# Patient Record
Sex: FEMALE | Race: WHITE | Employment: OTHER | URBAN - METROPOLITAN AREA
[De-identification: names, ages, dates, MRNs, and addresses within clinical notes are randomized per-mention and may not be internally consistent; named-entity substitution may affect disease eponyms.]

---

## 2017-01-01 ENCOUNTER — HOSPICE ADMISSION (OUTPATIENT)
Dept: HOSPICE | Facility: HOSPICE | Age: 82
End: 2017-01-01
Payer: MEDICARE

## 2017-01-01 ENCOUNTER — HOME CARE VISIT (OUTPATIENT)
Dept: HOSPICE | Facility: HOSPICE | Age: 82
End: 2017-01-01
Payer: MEDICARE

## 2017-01-01 ENCOUNTER — HOME CARE VISIT (OUTPATIENT)
Dept: SCHEDULING | Facility: HOME HEALTH | Age: 82
End: 2017-01-01
Payer: MEDICARE

## 2017-01-01 ENCOUNTER — APPOINTMENT (OUTPATIENT)
Dept: GENERAL RADIOLOGY | Age: 82
DRG: 689 | End: 2017-01-01
Attending: INTERNAL MEDICINE
Payer: COMMERCIAL

## 2017-01-01 ENCOUNTER — APPOINTMENT (OUTPATIENT)
Dept: GENERAL RADIOLOGY | Age: 82
DRG: 689 | End: 2017-01-01
Attending: EMERGENCY MEDICINE
Payer: COMMERCIAL

## 2017-01-01 ENCOUNTER — HOSPITAL ENCOUNTER (INPATIENT)
Age: 82
LOS: 5 days | Discharge: NURSING, REHAB OR LONG-TERM CARE | DRG: 689 | End: 2017-02-25
Attending: EMERGENCY MEDICINE | Admitting: INTERNAL MEDICINE
Payer: COMMERCIAL

## 2017-01-01 VITALS
OXYGEN SATURATION: 95 % | WEIGHT: 91.49 LBS | HEART RATE: 89 BPM | RESPIRATION RATE: 16 BRPM | HEIGHT: 66 IN | DIASTOLIC BLOOD PRESSURE: 93 MMHG | BODY MASS INDEX: 14.7 KG/M2 | SYSTOLIC BLOOD PRESSURE: 137 MMHG | TEMPERATURE: 97.8 F

## 2017-01-01 VITALS
BODY MASS INDEX: 16.71 KG/M2 | OXYGEN SATURATION: 96 % | SYSTOLIC BLOOD PRESSURE: 126 MMHG | DIASTOLIC BLOOD PRESSURE: 74 MMHG | RESPIRATION RATE: 18 BRPM | HEART RATE: 120 BPM | HEIGHT: 66 IN | WEIGHT: 104 LBS

## 2017-01-01 VITALS
DIASTOLIC BLOOD PRESSURE: 70 MMHG | SYSTOLIC BLOOD PRESSURE: 132 MMHG | OXYGEN SATURATION: 95 % | RESPIRATION RATE: 18 BRPM | HEART RATE: 58 BPM

## 2017-01-01 DIAGNOSIS — N17.9 ACUTE RENAL FAILURE, UNSPECIFIED ACUTE RENAL FAILURE TYPE (HCC): ICD-10-CM

## 2017-01-01 DIAGNOSIS — E86.0 DEHYDRATION: Primary | ICD-10-CM

## 2017-01-01 DIAGNOSIS — N30.00 ACUTE CYSTITIS WITHOUT HEMATURIA: ICD-10-CM

## 2017-01-01 LAB
ALBUMIN SERPL BCP-MCNC: 2.8 G/DL (ref 3.5–5)
ALBUMIN SERPL BCP-MCNC: 3.2 G/DL (ref 3.5–5)
ALBUMIN/GLOB SERPL: 0.7 {RATIO} (ref 1.1–2.2)
ALBUMIN/GLOB SERPL: 0.7 {RATIO} (ref 1.1–2.2)
ALP SERPL-CCNC: 44 U/L (ref 45–117)
ALP SERPL-CCNC: 48 U/L (ref 45–117)
ALT SERPL-CCNC: 12 U/L (ref 12–78)
ALT SERPL-CCNC: 14 U/L (ref 12–78)
ANION GAP BLD CALC-SCNC: 11 MMOL/L (ref 5–15)
ANION GAP BLD CALC-SCNC: 12 MMOL/L (ref 5–15)
ANION GAP BLD CALC-SCNC: 8 MMOL/L (ref 5–15)
ANION GAP BLD CALC-SCNC: 9 MMOL/L (ref 5–15)
APPEARANCE UR: ABNORMAL
AST SERPL W P-5'-P-CCNC: 15 U/L (ref 15–37)
AST SERPL W P-5'-P-CCNC: 20 U/L (ref 15–37)
ATRIAL RATE: 81 BPM
BACTERIA SPEC CULT: ABNORMAL
BACTERIA SPEC CULT: NORMAL
BACTERIA URNS QL MICRO: ABNORMAL /HPF
BASOPHILS # BLD AUTO: 0 K/UL (ref 0–0.1)
BASOPHILS # BLD: 0 % (ref 0–1)
BILIRUB SERPL-MCNC: 0.7 MG/DL (ref 0.2–1)
BILIRUB SERPL-MCNC: 0.7 MG/DL (ref 0.2–1)
BILIRUB UR QL: NEGATIVE
BNP SERPL-MCNC: 2456 PG/ML (ref 0–450)
BUN SERPL-MCNC: 16 MG/DL (ref 6–20)
BUN SERPL-MCNC: 20 MG/DL (ref 6–20)
BUN SERPL-MCNC: 30 MG/DL (ref 6–20)
BUN SERPL-MCNC: 35 MG/DL (ref 6–20)
BUN/CREAT SERPL: 24 (ref 12–20)
BUN/CREAT SERPL: 26 (ref 12–20)
BUN/CREAT SERPL: 27 (ref 12–20)
BUN/CREAT SERPL: 29 (ref 12–20)
CALCIUM SERPL-MCNC: 8.3 MG/DL (ref 8.5–10.1)
CALCIUM SERPL-MCNC: 8.6 MG/DL (ref 8.5–10.1)
CALCIUM SERPL-MCNC: 8.8 MG/DL (ref 8.5–10.1)
CALCIUM SERPL-MCNC: 9.1 MG/DL (ref 8.5–10.1)
CALCULATED P AXIS, ECG09: -5 DEGREES
CALCULATED R AXIS, ECG10: 43 DEGREES
CALCULATED T AXIS, ECG11: 72 DEGREES
CC UR VC: ABNORMAL
CHLORIDE SERPL-SCNC: 101 MMOL/L (ref 97–108)
CHLORIDE SERPL-SCNC: 102 MMOL/L (ref 97–108)
CHLORIDE SERPL-SCNC: 102 MMOL/L (ref 97–108)
CHLORIDE SERPL-SCNC: 104 MMOL/L (ref 97–108)
CO2 SERPL-SCNC: 23 MMOL/L (ref 21–32)
CO2 SERPL-SCNC: 25 MMOL/L (ref 21–32)
CO2 SERPL-SCNC: 26 MMOL/L (ref 21–32)
CO2 SERPL-SCNC: 27 MMOL/L (ref 21–32)
COLOR UR: ABNORMAL
CREAT SERPL-MCNC: 0.66 MG/DL (ref 0.55–1.02)
CREAT SERPL-MCNC: 0.77 MG/DL (ref 0.55–1.02)
CREAT SERPL-MCNC: 1.05 MG/DL (ref 0.55–1.02)
CREAT SERPL-MCNC: 1.31 MG/DL (ref 0.55–1.02)
DIAGNOSIS, 93000: NORMAL
EOSINOPHIL # BLD: 0 K/UL (ref 0–0.4)
EOSINOPHIL NFR BLD: 0 % (ref 0–7)
EPITH CASTS URNS QL MICRO: ABNORMAL /LPF
ERYTHROCYTE [DISTWIDTH] IN BLOOD BY AUTOMATED COUNT: 14 % (ref 11.5–14.5)
ERYTHROCYTE [DISTWIDTH] IN BLOOD BY AUTOMATED COUNT: 14.1 % (ref 11.5–14.5)
ERYTHROCYTE [DISTWIDTH] IN BLOOD BY AUTOMATED COUNT: 14.2 % (ref 11.5–14.5)
FLUAV AG NPH QL IA: NEGATIVE
FLUBV AG NOSE QL IA: NEGATIVE
GLOBULIN SER CALC-MCNC: 3.9 G/DL (ref 2–4)
GLOBULIN SER CALC-MCNC: 4.3 G/DL (ref 2–4)
GLUCOSE SERPL-MCNC: 139 MG/DL (ref 65–100)
GLUCOSE SERPL-MCNC: 156 MG/DL (ref 65–100)
GLUCOSE SERPL-MCNC: 177 MG/DL (ref 65–100)
GLUCOSE SERPL-MCNC: 94 MG/DL (ref 65–100)
GLUCOSE UR STRIP.AUTO-MCNC: NEGATIVE MG/DL
HCT VFR BLD AUTO: 42.1 % (ref 35–47)
HCT VFR BLD AUTO: 44.2 % (ref 35–47)
HCT VFR BLD AUTO: 44.2 % (ref 35–47)
HGB BLD-MCNC: 14.1 G/DL (ref 11.5–16)
HGB BLD-MCNC: 14.4 G/DL (ref 11.5–16)
HGB BLD-MCNC: 15 G/DL (ref 11.5–16)
HGB UR QL STRIP: ABNORMAL
INR PPP: 1.2 (ref 0.9–1.1)
KETONES UR QL STRIP.AUTO: ABNORMAL MG/DL
LACTATE SERPL-SCNC: 1.4 MMOL/L (ref 0.4–2)
LEUKOCYTE ESTERASE UR QL STRIP.AUTO: ABNORMAL
LYMPHOCYTES # BLD AUTO: 15 % (ref 12–49)
LYMPHOCYTES # BLD: 1 K/UL (ref 0.8–3.5)
MAGNESIUM SERPL-MCNC: 2.2 MG/DL (ref 1.6–2.4)
MAGNESIUM SERPL-MCNC: 2.3 MG/DL (ref 1.6–2.4)
MAGNESIUM SERPL-MCNC: 2.5 MG/DL (ref 1.6–2.4)
MCH RBC QN AUTO: 27.1 PG (ref 26–34)
MCH RBC QN AUTO: 27.6 PG (ref 26–34)
MCH RBC QN AUTO: 27.9 PG (ref 26–34)
MCHC RBC AUTO-ENTMCNC: 32.6 G/DL (ref 30–36.5)
MCHC RBC AUTO-ENTMCNC: 33.5 G/DL (ref 30–36.5)
MCHC RBC AUTO-ENTMCNC: 33.9 G/DL (ref 30–36.5)
MCV RBC AUTO: 82.3 FL (ref 80–99)
MCV RBC AUTO: 82.4 FL (ref 80–99)
MCV RBC AUTO: 83.1 FL (ref 80–99)
MONOCYTES # BLD: 1 K/UL (ref 0–1)
MONOCYTES NFR BLD AUTO: 15 % (ref 5–13)
NEUTS SEG # BLD: 4.7 K/UL (ref 1.8–8)
NEUTS SEG NFR BLD AUTO: 70 % (ref 32–75)
NITRITE UR QL STRIP.AUTO: NEGATIVE
P-R INTERVAL, ECG05: 192 MS
PH UR STRIP: 6 [PH] (ref 5–8)
PHOSPHATE SERPL-MCNC: 2.3 MG/DL (ref 2.6–4.7)
PLATELET # BLD AUTO: 193 K/UL (ref 150–400)
PLATELET # BLD AUTO: 228 K/UL (ref 150–400)
PLATELET # BLD AUTO: 239 K/UL (ref 150–400)
POTASSIUM SERPL-SCNC: 3.2 MMOL/L (ref 3.5–5.1)
POTASSIUM SERPL-SCNC: 3.3 MMOL/L (ref 3.5–5.1)
POTASSIUM SERPL-SCNC: 3.9 MMOL/L (ref 3.5–5.1)
POTASSIUM SERPL-SCNC: 4.1 MMOL/L (ref 3.5–5.1)
PROT SERPL-MCNC: 6.7 G/DL (ref 6.4–8.2)
PROT SERPL-MCNC: 7.5 G/DL (ref 6.4–8.2)
PROT UR STRIP-MCNC: 100 MG/DL
PROTHROMBIN TIME: 12.2 SEC (ref 9–11.1)
Q-T INTERVAL, ECG07: 382 MS
QRS DURATION, ECG06: 74 MS
QTC CALCULATION (BEZET), ECG08: 443 MS
RBC # BLD AUTO: 5.11 M/UL (ref 3.8–5.2)
RBC # BLD AUTO: 5.32 M/UL (ref 3.8–5.2)
RBC # BLD AUTO: 5.37 M/UL (ref 3.8–5.2)
RBC #/AREA URNS HPF: ABNORMAL /HPF (ref 0–5)
SERVICE CMNT-IMP: ABNORMAL
SERVICE CMNT-IMP: NORMAL
SODIUM SERPL-SCNC: 137 MMOL/L (ref 136–145)
SODIUM SERPL-SCNC: 137 MMOL/L (ref 136–145)
SODIUM SERPL-SCNC: 138 MMOL/L (ref 136–145)
SODIUM SERPL-SCNC: 138 MMOL/L (ref 136–145)
SP GR UR REFRACTOMETRY: 1.01 (ref 1–1.03)
TROPONIN I SERPL-MCNC: <0.04 NG/ML
UROBILINOGEN UR QL STRIP.AUTO: 1 EU/DL (ref 0.2–1)
VENTRICULAR RATE, ECG03: 81 BPM
WBC # BLD AUTO: 10.8 K/UL (ref 3.6–11)
WBC # BLD AUTO: 6.7 K/UL (ref 3.6–11)
WBC # BLD AUTO: 7.1 K/UL (ref 3.6–11)
WBC URNS QL MICRO: ABNORMAL /HPF (ref 0–4)

## 2017-01-01 PROCEDURE — 65270000015 HC RM PRIVATE ONCOLOGY

## 2017-01-01 PROCEDURE — 0651 HSPC ROUTINE HOME CARE

## 2017-01-01 PROCEDURE — 84100 ASSAY OF PHOSPHORUS: CPT | Performed by: INTERNAL MEDICINE

## 2017-01-01 PROCEDURE — G0156 HHCP-SVS OF AIDE,EA 15 MIN: HCPCS

## 2017-01-01 PROCEDURE — 77010033678 HC OXYGEN DAILY

## 2017-01-01 PROCEDURE — HOSPICE MEDICATION HC HH HOSPICE MEDICATION

## 2017-01-01 PROCEDURE — 74011250637 HC RX REV CODE- 250/637: Performed by: INTERNAL MEDICINE

## 2017-01-01 PROCEDURE — 97116 GAIT TRAINING THERAPY: CPT

## 2017-01-01 PROCEDURE — 85610 PROTHROMBIN TIME: CPT | Performed by: EMERGENCY MEDICINE

## 2017-01-01 PROCEDURE — A9270 NON-COVERED ITEM OR SERVICE: HCPCS

## 2017-01-01 PROCEDURE — 97165 OT EVAL LOW COMPLEX 30 MIN: CPT

## 2017-01-01 PROCEDURE — 87804 INFLUENZA ASSAY W/OPTIC: CPT | Performed by: EMERGENCY MEDICINE

## 2017-01-01 PROCEDURE — 87040 BLOOD CULTURE FOR BACTERIA: CPT | Performed by: EMERGENCY MEDICINE

## 2017-01-01 PROCEDURE — 74011250636 HC RX REV CODE- 250/636: Performed by: INTERNAL MEDICINE

## 2017-01-01 PROCEDURE — 97530 THERAPEUTIC ACTIVITIES: CPT | Performed by: OCCUPATIONAL THERAPIST

## 2017-01-01 PROCEDURE — 3336590001 HSPC ROOM AND BOARD

## 2017-01-01 PROCEDURE — T4541 LARGE DISPOSABLE UNDERPAD: HCPCS

## 2017-01-01 PROCEDURE — 77030011256 HC DRSG MEPILEX <16IN NO BORD MOLN -A

## 2017-01-01 PROCEDURE — G0155 HHCP-SVS OF CSW,EA 15 MIN: HCPCS

## 2017-01-01 PROCEDURE — 74011000258 HC RX REV CODE- 258: Performed by: INTERNAL MEDICINE

## 2017-01-01 PROCEDURE — RAB02 HC HSPC R&B RUG RATE

## 2017-01-01 PROCEDURE — 81001 URINALYSIS AUTO W/SCOPE: CPT | Performed by: EMERGENCY MEDICINE

## 2017-01-01 PROCEDURE — 97535 SELF CARE MNGMENT TRAINING: CPT

## 2017-01-01 PROCEDURE — 80053 COMPREHEN METABOLIC PANEL: CPT | Performed by: INTERNAL MEDICINE

## 2017-01-01 PROCEDURE — 85027 COMPLETE CBC AUTOMATED: CPT | Performed by: INTERNAL MEDICINE

## 2017-01-01 PROCEDURE — 3336500001 HSPC ELECTION

## 2017-01-01 PROCEDURE — 3330220001 HC HSPC R&B RUG RATE

## 2017-01-01 PROCEDURE — A6250 SKIN SEAL PROTECT MOISTURIZR: HCPCS

## 2017-01-01 PROCEDURE — G8978 MOBILITY CURRENT STATUS: HCPCS

## 2017-01-01 PROCEDURE — 87086 URINE CULTURE/COLONY COUNT: CPT | Performed by: INTERNAL MEDICINE

## 2017-01-01 PROCEDURE — 87186 SC STD MICRODIL/AGAR DIL: CPT | Performed by: INTERNAL MEDICINE

## 2017-01-01 PROCEDURE — 36415 COLL VENOUS BLD VENIPUNCTURE: CPT | Performed by: EMERGENCY MEDICINE

## 2017-01-01 PROCEDURE — 36415 COLL VENOUS BLD VENIPUNCTURE: CPT | Performed by: INTERNAL MEDICINE

## 2017-01-01 PROCEDURE — HHS10554 SHAMPOO/BODY WASH 8 OZ ALOE VESTA

## 2017-01-01 PROCEDURE — 99284 EMERGENCY DEPT VISIT MOD MDM: CPT

## 2017-01-01 PROCEDURE — 71010 XR CHEST PORT: CPT

## 2017-01-01 PROCEDURE — 80048 BASIC METABOLIC PNL TOTAL CA: CPT | Performed by: INTERNAL MEDICINE

## 2017-01-01 PROCEDURE — 83735 ASSAY OF MAGNESIUM: CPT | Performed by: EMERGENCY MEDICINE

## 2017-01-01 PROCEDURE — 83605 ASSAY OF LACTIC ACID: CPT | Performed by: EMERGENCY MEDICINE

## 2017-01-01 PROCEDURE — 84484 ASSAY OF TROPONIN QUANT: CPT | Performed by: EMERGENCY MEDICINE

## 2017-01-01 PROCEDURE — 93005 ELECTROCARDIOGRAM TRACING: CPT

## 2017-01-01 PROCEDURE — G0299 HHS/HOSPICE OF RN EA 15 MIN: HCPCS

## 2017-01-01 PROCEDURE — 97161 PT EVAL LOW COMPLEX 20 MIN: CPT

## 2017-01-01 PROCEDURE — A4927 NON-STERILE GLOVES: HCPCS

## 2017-01-01 PROCEDURE — 83880 ASSAY OF NATRIURETIC PEPTIDE: CPT | Performed by: EMERGENCY MEDICINE

## 2017-01-01 PROCEDURE — 99218 HC RM OBSERVATION: CPT

## 2017-01-01 PROCEDURE — 97535 SELF CARE MNGMENT TRAINING: CPT | Performed by: OCCUPATIONAL THERAPIST

## 2017-01-01 PROCEDURE — 80053 COMPREHEN METABOLIC PANEL: CPT | Performed by: EMERGENCY MEDICINE

## 2017-01-01 PROCEDURE — 85025 COMPLETE CBC W/AUTO DIFF WBC: CPT | Performed by: EMERGENCY MEDICINE

## 2017-01-01 PROCEDURE — 92526 ORAL FUNCTION THERAPY: CPT | Performed by: SPEECH-LANGUAGE PATHOLOGIST

## 2017-01-01 PROCEDURE — 83735 ASSAY OF MAGNESIUM: CPT | Performed by: INTERNAL MEDICINE

## 2017-01-01 PROCEDURE — 92610 EVALUATE SWALLOWING FUNCTION: CPT | Performed by: SPEECH-LANGUAGE PATHOLOGIST

## 2017-01-01 PROCEDURE — SE204 HC HSPC R&B RUG RATE

## 2017-01-01 PROCEDURE — 87077 CULTURE AEROBIC IDENTIFY: CPT | Performed by: INTERNAL MEDICINE

## 2017-01-01 PROCEDURE — G8979 MOBILITY GOAL STATUS: HCPCS

## 2017-01-01 PROCEDURE — T4526 ADULT SIZE PULL-ON MED: HCPCS

## 2017-01-01 PROCEDURE — T4522 ADULT SIZE BRIEF/DIAPER MED: HCPCS

## 2017-01-01 RX ORDER — ENOXAPARIN SODIUM 100 MG/ML
25 INJECTION SUBCUTANEOUS EVERY 24 HOURS
Status: DISCONTINUED | OUTPATIENT
Start: 2017-01-01 | End: 2017-01-01

## 2017-01-01 RX ORDER — FLUCONAZOLE 100 MG/1
200 TABLET ORAL
Status: COMPLETED | OUTPATIENT
Start: 2017-01-01 | End: 2017-01-01

## 2017-01-01 RX ORDER — GUAIFENESIN 100 MG/5ML
81 LIQUID (ML) ORAL DAILY
Status: DISCONTINUED | OUTPATIENT
Start: 2017-01-01 | End: 2017-01-01 | Stop reason: HOSPADM

## 2017-01-01 RX ORDER — DEXTROSE, SODIUM CHLORIDE, AND POTASSIUM CHLORIDE 5; .45; .15 G/100ML; G/100ML; G/100ML
100 INJECTION INTRAVENOUS CONTINUOUS
Status: DISCONTINUED | OUTPATIENT
Start: 2017-01-01 | End: 2017-01-01

## 2017-01-01 RX ORDER — POTASSIUM CHLORIDE 7.45 MG/ML
10 INJECTION INTRAVENOUS
Status: COMPLETED | OUTPATIENT
Start: 2017-01-01 | End: 2017-01-01

## 2017-01-01 RX ORDER — DIPHENHYDRAMINE HCL 25 MG
25 CAPSULE ORAL
Status: DISCONTINUED | OUTPATIENT
Start: 2017-01-01 | End: 2017-01-01 | Stop reason: HOSPADM

## 2017-01-01 RX ORDER — ENOXAPARIN SODIUM 100 MG/ML
20 INJECTION SUBCUTANEOUS EVERY 24 HOURS
Status: DISCONTINUED | OUTPATIENT
Start: 2017-01-01 | End: 2017-01-01 | Stop reason: HOSPADM

## 2017-01-01 RX ORDER — POLYETHYLENE GLYCOL 3350 17 G/17G
17 POWDER, FOR SOLUTION ORAL DAILY
Status: DISCONTINUED | OUTPATIENT
Start: 2017-01-01 | End: 2017-01-01 | Stop reason: HOSPADM

## 2017-01-01 RX ORDER — FLUCONAZOLE 100 MG/1
100 TABLET ORAL DAILY
Status: DISCONTINUED | OUTPATIENT
Start: 2017-01-01 | End: 2017-01-01 | Stop reason: HOSPADM

## 2017-01-01 RX ORDER — FLUCONAZOLE 100 MG/1
100 TABLET ORAL DAILY
Qty: 5 TAB | Refills: 0 | Status: SHIPPED | OUTPATIENT
Start: 2017-01-01 | End: 2017-01-01

## 2017-01-01 RX ORDER — AMOXICILLIN 250 MG
1 CAPSULE ORAL DAILY
Status: DISCONTINUED | OUTPATIENT
Start: 2017-01-01 | End: 2017-01-01 | Stop reason: HOSPADM

## 2017-01-01 RX ORDER — FACIAL-BODY WIPES
10 EACH TOPICAL ONCE
Status: DISCONTINUED | OUTPATIENT
Start: 2017-01-01 | End: 2017-01-01 | Stop reason: HOSPADM

## 2017-01-01 RX ORDER — SODIUM CHLORIDE 0.9 % (FLUSH) 0.9 %
5-10 SYRINGE (ML) INJECTION EVERY 8 HOURS
Status: DISCONTINUED | OUTPATIENT
Start: 2017-01-01 | End: 2017-01-01 | Stop reason: HOSPADM

## 2017-01-01 RX ORDER — IPRATROPIUM BROMIDE AND ALBUTEROL SULFATE 2.5; .5 MG/3ML; MG/3ML
3 SOLUTION RESPIRATORY (INHALATION)
Status: DISCONTINUED | OUTPATIENT
Start: 2017-01-01 | End: 2017-01-01 | Stop reason: HOSPADM

## 2017-01-01 RX ORDER — ACETAMINOPHEN 325 MG/1
650 TABLET ORAL
Status: DISCONTINUED | OUTPATIENT
Start: 2017-01-01 | End: 2017-01-01 | Stop reason: HOSPADM

## 2017-01-01 RX ORDER — POTASSIUM CHLORIDE 20 MEQ/1
40 TABLET, EXTENDED RELEASE ORAL
Status: DISCONTINUED | OUTPATIENT
Start: 2017-01-01 | End: 2017-01-01

## 2017-01-01 RX ORDER — ONDANSETRON 2 MG/ML
4 INJECTION INTRAMUSCULAR; INTRAVENOUS
Status: DISCONTINUED | OUTPATIENT
Start: 2017-01-01 | End: 2017-01-01 | Stop reason: HOSPADM

## 2017-01-01 RX ORDER — SODIUM CHLORIDE 0.9 % (FLUSH) 0.9 %
5-10 SYRINGE (ML) INJECTION AS NEEDED
Status: DISCONTINUED | OUTPATIENT
Start: 2017-01-01 | End: 2017-01-01 | Stop reason: HOSPADM

## 2017-01-01 RX ADMIN — DEXTROSE MONOHYDRATE, SODIUM CHLORIDE, AND POTASSIUM CHLORIDE 100 ML/HR: 50; 4.5; 1.49 INJECTION, SOLUTION INTRAVENOUS at 20:23

## 2017-01-01 RX ADMIN — Medication 10 ML: at 21:02

## 2017-01-01 RX ADMIN — CEFTRIAXONE 1 G: 1 INJECTION, POWDER, FOR SOLUTION INTRAMUSCULAR; INTRAVENOUS at 09:30

## 2017-01-01 RX ADMIN — ACETAMINOPHEN 650 MG: 325 TABLET, FILM COATED ORAL at 05:35

## 2017-01-01 RX ADMIN — ACETAMINOPHEN 650 MG: 325 TABLET, FILM COATED ORAL at 20:51

## 2017-01-01 RX ADMIN — ENOXAPARIN SODIUM 20 MG: 60 INJECTION SUBCUTANEOUS at 21:01

## 2017-01-01 RX ADMIN — FLUCONAZOLE 200 MG: 100 TABLET ORAL at 16:19

## 2017-01-01 RX ADMIN — ASPIRIN 81 MG CHEWABLE TABLET 81 MG: 81 TABLET CHEWABLE at 08:40

## 2017-01-01 RX ADMIN — POTASSIUM CHLORIDE 10 MEQ: 10 INJECTION, SOLUTION INTRAVENOUS at 11:29

## 2017-01-01 RX ADMIN — POLYETHYLENE GLYCOL 3350 17 G: 17 POWDER, FOR SOLUTION ORAL at 13:27

## 2017-01-01 RX ADMIN — CEFTRIAXONE 1 G: 1 INJECTION, POWDER, FOR SOLUTION INTRAMUSCULAR; INTRAVENOUS at 08:42

## 2017-01-01 RX ADMIN — ENOXAPARIN SODIUM 20 MG: 60 INJECTION SUBCUTANEOUS at 22:04

## 2017-01-01 RX ADMIN — FLUCONAZOLE 100 MG: 100 TABLET ORAL at 08:40

## 2017-01-01 RX ADMIN — ENOXAPARIN SODIUM 25 MG: 60 INJECTION SUBCUTANEOUS at 01:07

## 2017-01-01 RX ADMIN — Medication 10 ML: at 13:41

## 2017-01-01 RX ADMIN — POTASSIUM CHLORIDE 10 MEQ: 10 INJECTION, SOLUTION INTRAVENOUS at 20:01

## 2017-01-01 RX ADMIN — ENOXAPARIN SODIUM 20 MG: 60 INJECTION SUBCUTANEOUS at 23:12

## 2017-01-01 RX ADMIN — FLUCONAZOLE 100 MG: 100 TABLET ORAL at 09:31

## 2017-01-01 RX ADMIN — ASPIRIN 81 MG CHEWABLE TABLET 81 MG: 81 TABLET CHEWABLE at 13:27

## 2017-01-01 RX ADMIN — Medication 10 ML: at 05:36

## 2017-01-01 RX ADMIN — Medication 10 ML: at 05:22

## 2017-01-01 RX ADMIN — Medication 10 ML: at 08:44

## 2017-01-01 RX ADMIN — Medication 10 ML: at 07:57

## 2017-01-01 RX ADMIN — POTASSIUM CHLORIDE 10 MEQ: 10 INJECTION, SOLUTION INTRAVENOUS at 17:36

## 2017-01-01 RX ADMIN — Medication 10 ML: at 23:12

## 2017-01-01 RX ADMIN — CEFTRIAXONE 1 G: 1 INJECTION, POWDER, FOR SOLUTION INTRAMUSCULAR; INTRAVENOUS at 08:55

## 2017-01-01 RX ADMIN — Medication 10 ML: at 13:10

## 2017-01-01 RX ADMIN — DOCUSATE SODIUM AND SENNOSIDES 1 TABLET: 8.6; 5 TABLET, FILM COATED ORAL at 13:27

## 2017-01-01 RX ADMIN — CEFTRIAXONE 1 G: 1 INJECTION, POWDER, FOR SOLUTION INTRAMUSCULAR; INTRAVENOUS at 08:44

## 2017-01-01 RX ADMIN — DEXTROSE MONOHYDRATE, SODIUM CHLORIDE, AND POTASSIUM CHLORIDE 100 ML/HR: 50; 4.5; 1.49 INJECTION, SOLUTION INTRAVENOUS at 08:43

## 2017-01-01 RX ADMIN — ACETAMINOPHEN 650 MG: 325 TABLET, FILM COATED ORAL at 21:02

## 2017-01-01 RX ADMIN — Medication 10 ML: at 22:09

## 2017-01-01 RX ADMIN — ACETAMINOPHEN 650 MG: 325 TABLET, FILM COATED ORAL at 21:33

## 2017-01-01 RX ADMIN — Medication 10 ML: at 14:34

## 2017-01-01 RX ADMIN — Medication 10 ML: at 01:07

## 2017-01-01 RX ADMIN — ENOXAPARIN SODIUM 20 MG: 60 INJECTION SUBCUTANEOUS at 21:12

## 2017-01-01 RX ADMIN — Medication 10 ML: at 21:12

## 2017-01-01 RX ADMIN — DEXTROSE MONOHYDRATE, SODIUM CHLORIDE, AND POTASSIUM CHLORIDE 100 ML/HR: 50; 4.5; 1.49 INJECTION, SOLUTION INTRAVENOUS at 08:57

## 2017-01-01 RX ADMIN — CEFTRIAXONE 1 G: 1 INJECTION, POWDER, FOR SOLUTION INTRAMUSCULAR; INTRAVENOUS at 09:24

## 2017-02-20 PROBLEM — E86.0 DEHYDRATION: Status: ACTIVE | Noted: 2017-01-01

## 2017-02-20 PROBLEM — N17.9 ARF (ACUTE RENAL FAILURE) (HCC): Status: ACTIVE | Noted: 2017-01-01

## 2017-02-20 PROBLEM — R62.7 FTT (FAILURE TO THRIVE) IN ADULT: Status: ACTIVE | Noted: 2017-01-01

## 2017-02-20 PROBLEM — R53.83 FATIGUE: Status: ACTIVE | Noted: 2017-01-01

## 2017-02-20 PROBLEM — R52 BODY ACHES: Status: ACTIVE | Noted: 2017-01-01

## 2017-02-20 PROBLEM — R63.0 ANOREXIA: Status: ACTIVE | Noted: 2017-01-01

## 2017-02-20 NOTE — IP AVS SNAPSHOT
[address] [facility] 
[phone] Patient: [facility] MRN: [id number] You are allergic to the following Allergen Reactions Codeine Other (comments)  
 confused Sulfa (Sulfonamide Antibiotics) Nausea and Vomiting Recent Documentation Height  
  
  
  
  
  
 1.676 m Unresulted Labs Order Current Status CULTURE, BLOOD Preliminary result Emergency Contacts  (Rel.) Home Phone Work Phone Mobile Phone Narda Barry (Child) [phone] -- --  
 Camille Hill (Other Relative) -- -- [phone] About your hospitalization You were admitted on:  February 20, 2017 You last received care in the:  [address] You were discharged on:  February 25, 2017 Why you were hospitalized Your primary diagnosis was:  Uti (Urinary Tract Infection) Your diagnoses also included:  Dementia, Do Not Resuscitate, Debility, Dysphagia S/P Cva (Cerebrovascular Accident), Htn (Hypertension), Dehydration, Arf (Acute Renal Failure) ([facility]), Fatigue, Body Aches, Ftt (Failure To Thrive) In Adult, Macular Degeneration, Hypercholesteremia, Hx Of Completed Stroke, Gerd (Gastroesophageal Reflux Disease), Essential Tremor, Copd (Chronic Obstructive Pulmonary Disease) ([facility]), Arthritis, Abnormal Chest Ct, Anorexia Providers Seen During Your Hospitalizations Provider Role Specialty Primary office phone Corazon Hawkins MD Attending Provider Emergency Medicine [phone] Charmaine Bryant MD Attending Provider Internal Medicine [phone] Travis Cheema MD Attending Provider Hospitalist [phone] Your Primary Care Physician (PCP) Primary Care Physician Office Phone Office Fax Denise Chand [phone] Follow-up Information Follow up With Details Comments Contact Info Geremias Marquez MD   [address] [facility] [address] [place] [address] 
[phone] Current Discharge Medication List  
  
START taking these medications Dose & Instructions Dispensing Information Comments Morning Noon Evening Bedtime  
 fluconazole 100 mg tablet Commonly known as:  DIFLUCAN Your next dose is: Today, Tomorrow Other:  _________ Dose:  100 mg Take 1 Tab by mouth daily for 5 doses. FDA advises cautious prescribing of oral fluconazole in pregnancy. Quantity:  5 Tab Refills:  0 CONTINUE these medications which have NOT CHANGED Dose & Instructions Dispensing Information Comments Morning Noon Evening Bedtime  
 amLODIPine 2.5 mg tablet Commonly known as:  Elyn Coffer Your next dose is: Today, Tomorrow Other:  _________ Dose:  2.5 mg Take 1 Tab by mouth daily. Quantity:  20 Tab Refills:  0  
     
   
   
   
  
 aspirin 81 mg chewable tablet Your next dose is: Today, Tomorrow Other:  _________ Dose:  81 mg Take 81 mg by mouth daily. Refills:  0 DULCOLAX (BISACODYL) 5 mg EC tablet Generic drug:  bisacodyl Your next dose is: Today, Tomorrow Other:  _________ Dose:  5-10 mg Take 5-10 mg by mouth nightly as needed for Constipation. Refills:  0  
     
   
   
   
  
 polyethylene glycol 17 gram/dose powder Commonly known as:  Lelan Situ Your next dose is: Today, Tomorrow Other:  _________ Dose:  17 g Take 17 g by mouth daily. 1 tablespoon with 8 oz of water daily Quantity:  510 g Refills:  0 STOP taking these medications   
 labetalol 100 mg tablet Commonly known as:  Jorge Salter Where to Get Your Medications Information on where to get these meds will be given to you by the nurse or doctor. ! Ask your nurse or doctor about these medications  
  fluconazole 100 mg tablet Discharge Instructions HOSPITALIST DISCHARGE INSTRUCTIONS 
 
NAME: Yohannes Maxwell :  1922 MRN:  [id number] Date/Time:  2017 9:12 AM 
 
ADMIT DATE: 2017 DISCHARGE DATE: 2017 Attending Physician: Judy Brown MD 
 
DISCHARGE DIAGNOSIS: 
 
Acute encephalopathy ARF (acute renal failure) / Dehydration E. Coli UTI Oral thrush Severe protein calorie malnutrition / weight loss / Anorexia / FTT (failure to thrive) in adult / Abnormal chest CT with RUL spiculated opacity -POA Hx of cva / Dysphagia S/P CVA / Dementia Fatigue / [place] / Arthritis Hypokalemia -resolved HTN 
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 Medications: Per above medication reconciliation. Pain Management: per above medications Recommended diet: NDD2, ensure complete breakfast and dinner, magic cup with lunch Recommended activity: PT/OT Eval and Treat Wound care: None Indwelling devices:  None Supplemental Oxygen: None Required Lab work: Per SNF routine Glucose management:  None Code status: DNR Outside physician follow up: Follow-up Information None Skilled nursing facility/ SNF MD responsible for above on discharge. Information obtained by : 
I understand that if any problems occur once I am at home I am to contact my physician. I understand and acknowledge receipt of the instructions indicated above. Physician's or R.N.'s Signature                                                                  Date/Time Patient or Repres Discharge Orders None Master Route Announcement We are excited to announce that we are making your provider's discharge notes available to you in Master Route. You will see these notes when they are completed and signed by the physician that discharged you from your recent hospital stay. If you have any questions or concerns about any information you see in Master Route, please call the Health Information Department where you were seen or reach out to your Primary Care Provider for more information about your plan of care. Introducing [facility] HE[facility]! Dear Terrell Madrigal: 
Thank you for requesting a Master Route account. Our records indicate that you already have an active Master Route account. You can access your account anytime at [email or web address] Did you know that you can access your hospital and ER discharge instructions at any time in Master Route? You can also review all of your test results from your hospital stay or ER visit. Additional Information If you have questions, please visit the Frequently Asked Questions section of the Master Route website at [email or web address] Remember, Master Route is NOT to be used for urgent needs. For medical emergencies, dial 911. Now available from your iPhone and Android! General Information Please provide this summary of care documentation to your next provider. Patient Signature:  ____________________________________________________________ Date:  ____________________________________________________________  
  
Liudmila Matilde Provider Signature:  ____________________________________________________________ Date:  ____________________________________________________________

## 2017-02-20 NOTE — H&P
[address] [place] [address], [address] [facility]  [phone]    Hospitalist Admission Note      NAME:  Ambrosio Valdez   :   1922   MRN:  [id number]     PCP:  Hermelinda Gómez MD     Date/Time:  2017 5:50 PM          Subjective:     CHIEF COMPLAINT: fatigue     HISTORY OF PRESENT ILLNESS:     Ms. Jh Dunn is a 80 y.o.  female who presented to the Emergency Department complaining of fatigue. Worsening over weeks. Recent treatment of presumed PNA and UTI, with completed course of Levaquin and Bactrim. Recent finding of lung mass, with family decision to do no workup. Baseline weak with walker, and now worse, no ambulation. Mild non-productive cough. Poor appetite and significant weight loss in last months. ER workup show dehydration, but no other significant findings. We will admit her for management. Past Medical History   Diagnosis Date    Abnormal chest CT 2012     RUL spiculated opacity     Aortic aneurysm ([facility] [address] 8/3/2014     Aortic arch aneurysm with partial thrombosis seen on chest CT 2014.   MPOA granddaughter Raquel Cristobal aware, decline further evaluation except blood pressure control given age    [de-identified] Arthritis     COPD (chronic obstructive pulmonary disease) ([address]      on chest CT    DEMENTIA     Do not resuscitate 8/3/2014    Dysphagia due to old stroke 2012     family declined tube feed    Essential tremor     GERD (gastroesophageal reflux disease)     HTN (hypertension)     Hx of completed stroke     Hypercholesteremia     Liver mass 8/3/2014    Macular degeneration         Past Surgical History   Procedure Laterality Date    Hx hysterectomy      Hx orthopaedic       Right Shoulder surgery, Left wrist and Right elbow repair from Fractures    Hx cataract removal Bilateral     Hx colostomy take down      Hx colostomy      Hx carotid endarterectomy Left     Hx breast biopsy Bilateral        Social History   Substance Use Topics    Smoking status: Former Smoker    Smokeless tobacco: Never Used      Comment: quit 30 years ago,smoked for 20 years 1/2 pack day    Alcohol use 0.5 oz/week     1 Cans of beer per week      Comment: 1 beer aday but has not had one for 2 weeks        Family History   Problem Relation Age of Onset    Cancer Mother     Cancer Father         Allergies   Allergen Reactions    Codeine Other (comments)     confused    Sulfa (Sulfonamide Antibiotics) Nausea and Vomiting        Prior to Admission medications    Medication Sig Start Date End Date Taking? Authorizing Provider   amLODIPine (NORVASC) 2.5 mg tablet Take 1 Tab by mouth daily. 12/28/16   Lani Carlos MD   polyethylene glycol (MIRALAX) 17 gram/dose powder Take 17 g by mouth daily. 1 tablespoon with 8 oz of water daily 12/1/15   Marquis Morales MD   aspirin 81 mg chewable tablet Take 81 mg by mouth daily. Jonel Bright MD   bisacodyl (DULCOLAX, BISACODYL,) 5 mg EC tablet Take 5-10 mg by mouth nightly as needed for Constipation. Historical Provider   labetalol (NORMODYNE) 100 mg tablet Take 1 Tab by mouth two (2) times a day.  9/18/12   Nay Macias MD       Review of Systems:  (bold if positive, if negative)    Gen:  weight loss,fatigueEyes:  ENT:  CVS:  Pulm:  CoughdyspneaGI:  nausea  :    MS:  arthritisSkin:  Psych:  Endo:    Hem:  Renal:    Neuro:  weakness      Objective:      VITALS:    Vital signs reviewed; most recent are:    Visit Vitals    /74 (BP 1 Location: Left arm, BP Patient Position: At rest)    Pulse 86    Temp 98.6 °F (37 °C)    Resp 18    Ht 5' 6\" (1.676 m)    Wt 47.6 kg (104 lb 15 oz)    SpO2 96%    BMI 16.94 kg/m2     SpO2 Readings from Last 6 Encounters:   02/20/17 96%   12/28/16 95%   07/17/16 99%   01/20/16 98%   01/17/16 95%   12/01/15 99%        No intake or output data in the 24 hours ending 02/20/17 1750     Exam:     Physical Exam:    Gen:  Thin, frail, cachectic, in no acute distress  HEENT:  Pink conjunctivae, PERRL, hearing intact to voice, dry mucous membranes  Neck:  Supple, without masses, thyroid non-tender  Resp:  No accessory muscle use, clear breath sounds without wheezes rales or rhonchi  Card:  No murmurs, normal S1, S2 without thrills, bruits or peripheral edema  Abd:  Soft, scafoid, non-tender, non-distended, normoactive bowel sounds are present, no mass  Lymph:  No cervical or inguinal adenopathy  Musc:  No cyanosis or clubbing  Skin:  No rashes or ulcers, skin turgor is reduced  Neuro:  Cranial nerves are grossly intact, general motor weakness, follows commands vaguely  Psych:  Poor insight, oriented to person     Labs:    Recent Labs      02/20/17   1551   WBC  6.7   HGB  15.0   HCT  44.2   PLT  193     Recent Labs      02/20/17   1551   NA  138   K  3.3*   CL  101   CO2  26   GLU  139*   BUN  35*   CREA  1.31*   CA  9.1   MG  2.5*   ALB  3.2*   TBILI  0.7   SGOT  20   ALT  14     Lab Results   Component Value Date/Time    Glucose (POC) 135 08/04/2014 08:04 AM    Glucose (POC) 160 08/03/2014 10:06 PM     No results for input(s): PH, PCO2, PO2, HCO3, FIO2 in the last 72 hours. Recent Labs      02/20/17   1551   INR  1.2*          Assessment and Plan:      Fatigue / Debility / Arthritis / Essential tremor / Body aches - POA, multifactorial, DDx dehydration, aging, deconditioning, cancer, lost muscle mass, arthritis. Fall precautions, PT/OT eval.  I suggest hospice. ARF (acute renal failure) / Dehydration - POA due to poor PO intake. Hydrate and follow labs. Severe protein calorie malnutrition / weight loss / Anorexia / FTT (failure to thrive) in adult / Abnormal chest CT with RUL spiculated opacity - FTT POA, I suspect due to lung or other cancer. Perhaps other severe disease, vs dementia. I invasive workup declined, I suggest hospice. Diet as tolerated with supplements.     HTN (hypertension) - Would stop testing or treating, though continue BB if needed to present symptomatic tachycardia. Hx of completed stroke / Dysphagia S/P CVA (cerebrovascular accident) / Dementia - not on specific meds. Supportive care. Would hold ASA if going towards comfort and hospice. COPD (chronic obstructive pulmonary disease) - Noted on chest CT, but not on meds. No wheezing. GERD (gastroesophageal reflux disease) - PI if symptoms    Macular degeneration - Supportive care     Hypercholesteremia - No testing or treatment.       Telemetry reviewed:   normal sinus rhythm    Risk of deterioration: high      Total time spent with patient: 79 [address] [facility] discussed with: Patient, Family, Nursing Staff, Consultant/Specialist and >50% of time spent in counseling and coordination of care    Discussed:  Care Plan       ___________________________________________________    Attending Physician: Sekou Swain MD

## 2017-02-20 NOTE — ED PROVIDER NOTES
HPI Comments: Li Ying is a 80 y.o. Female [facility] resident, who is on 2 liters NCO2 at baseline and whom has a h/o Dementia, CVA, COPD, Bowel obstruction, Aortic aneurysm, Sepsis and GERD presents to [address] [facility] ED via EMS with cc intermittent symptoms of generalized fatigue as well as a fever (TMAX 99.4) and generalized body aches described as pain \"all over\" today. The patient has been evaluated for similar symptoms with the past week. On february 13th she was see for a fever of 101.0F, at which time she had a stat chest x-ray performed that showed a persistent right upper lobe infiltrate with 4cm persistent pulmonary mass, which is suspicious for cancer. The patient was started on a 7 day dose of Lavaquin 700mg, Tylenol 325mg, as well as Bactrim, the later of which was secondary to a chronic uti. In addition to the medications she recently began, the patient also regularly takes 81mg aspirin. The patient is here with her family today and they are concerned that the patient has not improved since starting these medications. The patient and her family deny that the patient has had any symptoms of nausea, vomiting, chest pain, shortness of breath, headache, rash, diarrhea, sweating, weight loss, hematuria or dysuria. PCP: Dianelys Bess MD    There are no other complaints changes or physical findings at this time  Written by Maryana Romero ED Scribe as dictated by [employer]. Mauricio Guerrero MD.    The history is provided by the patient and a relative. Past Medical History:   Diagnosis Date    Abnormal chest CT 9/2012     RUL spiculated opacity     Aortic aneurysm ([address] 8/3/2014     Aortic arch aneurysm with partial thrombosis seen on chest CT 8/2/2014.   MPOA granddaughter Raffaele Locus aware, decline further evaluation except blood pressure control given age    [employer] Arthritis     COPD (chronic obstructive pulmonary disease) ([address]      on chest CT    DEMENTIA     Do not resuscitate 8/3/2014    Dysphagia due to old stroke 9/2012     family declined tube feed    Essential tremor     GERD (gastroesophageal reflux disease)     HTN (hypertension)     Hx of completed stroke     Hypercholesteremia     Liver mass 8/3/2014    Macular degeneration        Past Surgical History:   Procedure Laterality Date    Hx hysterectomy      Hx orthopaedic       Right Shoulder surgery, Left wrist and Right elbow repair from Fractures    Hx cataract removal Bilateral     Hx colostomy take down      Hx colostomy      Hx carotid endarterectomy Left     Hx breast biopsy Bilateral          Family History:   Problem Relation Age of Onset    Cancer Mother     Cancer Father        Social History     Social History    Marital status: SINGLE     Spouse name: N/A    Number of children: N/A    Years of education: N/A     Occupational History    Not on file. Social History Main Topics    Smoking status: Former Smoker    Smokeless tobacco: Never Used      Comment: quit 30 years ago,smoked for 20 years 1/2 pack day    Alcohol use 0.5 oz/week     1 Cans of beer per week      Comment: 1 beer aday but has not had one for 2 weeks    Drug use: No    Sexual activity: Not on file     Other Topics Concern    Not on file     Social History Narrative         ALLERGIES: Codeine and Sulfa (sulfonamide antibiotics)    Review of Systems   Constitutional: Positive for fatigue and fever. Negative for activity change, appetite change, chills, diaphoresis and unexpected weight change. HENT: Negative. Negative for congestion, hearing loss, rhinorrhea, sneezing and voice change. Eyes: Negative. Negative for pain and visual disturbance. Respiratory: Negative. Negative for apnea, cough, choking, chest tightness and shortness of breath. Cardiovascular: Negative. Negative for chest pain and palpitations. Gastrointestinal: Negative. Negative for abdominal distention, abdominal pain, blood in stool, diarrhea, nausea and vomiting. Genitourinary: Negative. Negative for decreased urine volume, difficulty urinating, dysuria, flank pain, frequency, hematuria and urgency. No discharge   Musculoskeletal: Positive for myalgias (+generalized body aches). Negative for arthralgias, back pain and neck stiffness. Skin: Negative. Negative for color change and rash. Neurological: Negative. Negative for dizziness, seizures, syncope, speech difficulty, weakness, numbness and headaches. Hematological: Negative for adenopathy. Psychiatric/Behavioral: Negative. Negative for agitation, behavioral problems, dysphoric mood and suicidal ideas. The patient is not nervous/anxious. All other systems reviewed and are negative. Physical Exam   Constitutional: She is oriented to person, place, and time. She appears well-developed and well-nourished. No distress. HENT:   Head: Normocephalic and atraumatic. Mouth/Throat: Oropharynx is clear and moist. No oropharyngeal exudate. Eyes: Conjunctivae and EOM are normal. Pupils are equal, round, and reactive to light. Right eye exhibits no discharge. Left eye exhibits no discharge. Neck: Normal range of motion. Neck supple. Cardiovascular: Normal rate, regular rhythm and intact distal pulses. Exam reveals no gallop and no friction rub. No murmur heard. Pulmonary/Chest: Effort normal and breath sounds normal. No respiratory distress. She has no wheezes. She has no rales. She exhibits no tenderness. Abdominal: Soft. She exhibits no distension and no mass. There is no tenderness. There is no rebound and no guarding. Palpable pulse in the abdomen   Musculoskeletal: Normal range of motion. She exhibits no edema. Lymphadenopathy:     She has no cervical adenopathy. Neurological: She is alert and oriented to person, place, and time. No cranial nerve deficit. Coordination normal.   Skin: Skin is warm and dry. No rash noted. No erythema. Psychiatric: She has a normal mood and affect. Nursing note and vitals reviewed. MDM  Number of Diagnoses or Management Options  Diagnosis management comments: Ddx: Pneumonia, UTI, Sepsis       Amount and/or Complexity of Data Reviewed  Clinical lab tests: ordered and reviewed  Tests in the radiology section of CPT®: ordered and reviewed  Tests in the medicine section of CPT®: ordered and reviewed  Obtain history from someone other than the patient: yes (Pts family)  Review and summarize past medical records: yes  Independent visualization of images, tracings, or specimens: yes    Patient Progress  Patient progress: stable    ED Course       Procedures    Chief Complaint   Patient presents with    Lethargy     The patient presents to the Emergency Department via EMS from [facility] with complaints of increased lethargy. Patient is currently being treated for Pneumonia and a UTI.       2:40 PM  The patients presenting problems have been discussed, and they are in agreement with the care plan formulated and outlined with them. I have encouraged them to ask questions as they arise throughout their visit.     MEDICATIONS GIVEN:  Medications   sodium chloride (NS) flush 5-10 mL (not administered)   sodium chloride (NS) flush 5-10 mL (not administered)   dextrose 5% - 0.45% NaCl with KCl 20 mEq/L infusion (not administered)       LABS REVIEWED:  Recent Results (from the past 24 hour(s))   EKG, 12 LEAD, INITIAL    Collection Time: 02/20/17  3:08 PM   Result Value Ref Range    Ventricular Rate 81 BPM    Atrial Rate 81 BPM    P-R Interval 192 ms    QRS Duration 74 ms    Q-T Interval 382 ms    QTC Calculation (Bezet) 443 ms    Calculated P Axis -5 degrees    Calculated R Axis 43 degrees    Calculated T Axis 72 degrees    Diagnosis       Normal sinus rhythm  Normal ECG  When compared with ECG of 26-DEC-2016 20:09,  fusion complexes are no longer present     CBC WITH AUTOMATED DIFF    Collection Time: 02/20/17  3:51 PM   Result Value Ref Range    WBC 6.7 3.6 - 11.0 K/uL    RBC 5.37 (H) 3.80 - 5.20 M/uL    HGB 15.0 11.5 - 16.0 g/dL    HCT 44.2 35.0 - 47.0 %    MCV 82.3 80.0 - 99.0 FL    MCH 27.9 26.0 - 34.0 PG    MCHC 33.9 30.0 - 36.5 g/dL    RDW 14.1 11.5 - 14.5 %    PLATELET 562 783 - 206 K/uL    NEUTROPHILS 70 32 - 75 %    LYMPHOCYTES 15 12 - 49 %    MONOCYTES 15 (H) 5 - 13 %    EOSINOPHILS 0 0 - 7 %    BASOPHILS 0 0 - 1 %    ABS. NEUTROPHILS 4.7 1.8 - 8.0 K/UL    ABS. LYMPHOCYTES 1.0 0.8 - 3.5 K/UL    ABS. MONOCYTES 1.0 0.0 - 1.0 K/UL    ABS. EOSINOPHILS 0.0 0.0 - 0.4 K/UL    ABS. BASOPHILS 0.0 0.0 - 0.1 K/UL   METABOLIC PANEL, COMPREHENSIVE    Collection Time: 02/20/17  3:51 PM   Result Value Ref Range    Sodium 138 136 - 145 mmol/L    Potassium 3.3 (L) 3.5 - 5.1 mmol/L    Chloride 101 97 - 108 mmol/L    CO2 26 21 - 32 mmol/L    Anion gap 11 5 - 15 mmol/L    Glucose 139 (H) 65 - 100 mg/dL    BUN 35 (H) 6 - 20 MG/DL    Creatinine 1.31 (H) 0.55 - 1.02 MG/DL    BUN/Creatinine ratio 27 (H) 12 - 20      GFR est AA 46 (L) >60 ml/min/1.73m2    GFR est non-AA 38 (L) >60 ml/min/1.73m2    Calcium 9.1 8.5 - 10.1 MG/DL    Bilirubin, total 0.7 0.2 - 1.0 MG/DL    ALT (SGPT) 14 12 - 78 U/L    AST (SGOT) 20 15 - 37 U/L    Alk.  phosphatase 48 45 - 117 U/L    Protein, total 7.5 6.4 - 8.2 g/dL    Albumin 3.2 (L) 3.5 - 5.0 g/dL    Globulin 4.3 (H) 2.0 - 4.0 g/dL    A-G Ratio 0.7 (L) 1.1 - 2.2     MAGNESIUM    Collection Time: 02/20/17  3:51 PM   Result Value Ref Range    Magnesium 2.5 (H) 1.6 - 2.4 mg/dL   PROTHROMBIN TIME + INR    Collection Time: 02/20/17  3:51 PM   Result Value Ref Range    INR 1.2 (H) 0.9 - 1.1      Prothrombin time 12.2 (H) 9.0 - 11.1 sec   TROPONIN I    Collection Time: 02/20/17  3:51 PM   Result Value Ref Range    Troponin-I, Qt. <0.04 <0.05 ng/mL   PRO-BNP    Collection Time: 02/20/17  3:51 PM   Result Value Ref Range    NT pro-BNP 2456 (H) 0 - 450 PG/ML   LACTIC ACID, PLASMA    Collection Time: 02/20/17  3:52 PM   Result Value Ref Range    Lactic acid 1.4 0.4 - 2.0 MMOL/L   URINALYSIS W/MICROSCOPIC    Collection Time: 02/20/17  4:28 PM   Result Value Ref Range    Color YELLOW/STRAW      Appearance CLOUDY (A) CLEAR      Specific gravity 1.015 1.003 - 1.030      pH (UA) 6.0 5.0 - 8.0      Protein 100 (A) NEG mg/dL    Glucose NEGATIVE  NEG mg/dL    Ketone TRACE (A) NEG mg/dL    Bilirubin NEGATIVE  NEG      Blood LARGE (A) NEG      Urobilinogen 1.0 0.2 - 1.0 EU/dL    Nitrites NEGATIVE  NEG      Leukocyte Esterase MODERATE (A) NEG      WBC 20-50 0 - 4 /hpf    RBC 10-20 0 - 5 /hpf    Epithelial cells FEW FEW /lpf    Bacteria 2+ (A) NEG /hpf   INFLUENZA A & B AG (RAPID TEST)    Collection Time: 02/20/17  4:34 PM   Result Value Ref Range    Influenza A Antigen NEGATIVE  NEG      Influenza B Antigen NEGATIVE  NEG         VITAL SIGNS:  Patient Vitals for the past 12 hrs:   Temp Pulse Resp BP SpO2   02/20/17 1504 98.6 °F (37 °C) 86 18 135/74 96 %       RADIOLOGY RESULTS:  The following have been ordered and reviewed:  CXR Results  (Last 48 hours)               02/20/17 1557  XR CHEST PORT Final result    Impression:  IMPRESSION:       No acute process. Stable exam.           Narrative:  EXAM:  XR CHEST PORT       INDICATION:  Lethargy. Currently being treated for pneumonia and UTI. Generalized weakness for 2 weeks. COMPARISON: 12/26/2016       TECHNIQUE: Portable AP semiupright chest view at 1551 hours       FINDINGS: Cardiac monitoring wires overlie the thorax. The cardiomediastinal   contours are stable. The pulmonary vasculature is within normal limits. There is stable diffuse chronic markings that are most prominent in the right   upper lung. There is no new airspace opacity, pleural effusion, or pneumothorax. The bones and upper abdomen are stable. EKG interpretation: (Preliminary) 1508  Rhythm: normal sinus rhythm; and regular .  Rate (approx.): 81; Axis: normal; P wave: normal; QRS interval: normal ; ST/T wave: normal;     CONSULTATIONS: 5:16 PM  Sea Melissa MD spoke with Dr. Narcisa Simmons,   Specialty: Hospitalist  Discussed pt's hx, disposition, and available diagnostic and imaging results. Reviewed care plans. Consultant will evaluate pt for admission. Written by Alexandru Hernandez MD, ED Scribe, as dictated by Twyla Melissa MD.    PROGRESS NOTES:  4:25 PM  I have just reevaluated the patient. I have reviewed Her vital signs and determined there is currently no worsening in their condition or physical exam.  Results have been reviewed with them and their questions have been answered. We will continue to review further results as they come available. DIAGNOSIS:    1. Dehydration    2. Acute renal failure, unspecified acute renal failure type ([facility] [address]    3. Acute cystitis without hematuria        PLAN: Admit     5:16 PM  Patient is being admitted to the hospital.  The results of their tests and reasons for their admission have been discussed with them and/or available family. They convey agreement and understanding for the need to be admitted and for their admission diagnosis. Consultation has been made with the inpatient physician specialist for hospitalization. This note is prepared by Alexandru Hernandez MD acting as Scribe for [employer]. Elma Melissa, [address] [facility] Elma Melissa MD: The Scribe's documentation has been prepared under my direction and personally reviewed by me in its entirety. I confirm that the note above accurately reflects all work, treatment, procedures, and medical decision making performed by me.

## 2017-02-20 NOTE — Clinical Note
Thank you for allowing us to provide you with excellent care today. We hope we addressed all of your concerns and needs. We strive to provide excellent quality care in the Emergency Department. Please rate us as excellent, as anything less than exce llent does not meet our expectations. If you feel that you have not received excellent quality care or timely care, please ask to speak to the nurse manager. Please choose us in the future for your continued health care needs. The exam and treatm ent you received in the Emergency Department were for an urgent problem and are not intended as complete care. It is important that you follow-up with a doctor, nurse practitioner, or physician assistant to:  (1) confirm your diagnosis,  (2) re-evaluatio n of changes in your illness and treatment, and  (3) for ongoing care. If your symptoms become worse or you do not improve as expected and you are unable to reach your usual health care provider, you should return to the Emergency Department. We are yojana ilable 24 hours a day. Take this sheet with you when you go to your follow-up visit. If you have any problem arranging the follow-up visit, contact [address] [place] [address] 21 [phone]) Make an appointment with your Primary Care doctor for follow up of this visit. Re turn to the ER if you are unable to be seen in the time recommended on your discharge instructions.

## 2017-02-20 NOTE — ED NOTES
Pt arrives via EMS. Pt c/o generalized weakness x 2 weeks. Pt has hx of pneumonia and UTI and is being treated for these. Pt states she \"feels bad all over. \" Monitor x 2. Call bell within reach and plan of care discussed.

## 2017-02-21 NOTE — PROGRESS NOTES
Pt. Admitted under Observation from the ER for dehydration and ARF, and UTI. She is complaining of lethargy and not being able to walk. She has a hx. Of COPD, Dementia, CVA,Bowel Obstr., Aortic Aneursym, Sepsis, and Pulmonary mass- 4 cm. She is currently being treated for pneumonia and UTI. She lives at [facility] as a [facility] resident and is usually up with a RW. Her dtr. Lives in [place] and will be here this afternoon. The drPaco Is suggesting hospice and a consult has been entered. CM will follow up with dtr. -- Chris Hobbs RN, [facility]--[phone]--    Care Management Interventions  PCP Verified by CM:  Yes  Mode of Transport at Discharge: BLS  Transition of Care Consult (CM Consult): Discharge Planning, [address]: No  Discharge Durable Medical Equipment: No  Health Maintenance Reviewed: Yes  Physical Therapy Consult: Yes  Occupational Therapy Consult: Yes  Current Support Network: Nursing Facility  Confirm Follow Up Transport: Other (see comment) (lives at [facility])

## 2017-02-21 NOTE — PROGRESS NOTES
Problem: Self Care Deficits Care Plan (Adult)  Goal: *Acute Goals and Plan of Care (Insert Text)  Occupational Therapy Goals  Initiated 2/21/2017  1. Patient will perform self-feeding in supported sitting with supervision/set-up within 7 day(s). 2. Patient will perform upper body dressing while sitting EOB with supervision/set-up within 7 day(s). 3. Patient will perform grooming while sitting EOB with contact guard assist within 7 day(s). 4. Patient will perform toilet transfers with minimal assistance at  level within 7 day(s). OCCUPATIONAL THERAPY EVALUATION  Patient: Micky Chacon (4 y.o. female)  Date: 2/21/2017  Primary Diagnosis: fatigue        Precautions:  Contact, Fall      ASSESSMENT :  Based on the objective data described below, the patient presents with impaired functional mobility, activity tolerance and balance necessary in ADL tasks. She was alert to voices, oriented to self and location, and able to follow commands with addition verbal cues and time. She has hx of dementia, CVA, and lung malignancy. Multiple family members were present and reports that patient is a resident at [facility] where she was ambulatory at [facility] to bathroom and complete UB care/grooming with supervision. Additional assistance provided by staff for LB care. Not on O2 at baseline. Provided education through verbal cues for sequencing, safety and body mechanics for bed mobility at mod A, self care transfer at [facility] with mod A, and combing hair in support sitting with additional verbal cues and time for all ADL tasks. At this time patient needs min A to max A for ADL tasks. Recommend return to [facility] for skilled therapy services. Patient likes to be called \"Meemoo\"     Patient will benefit from skilled intervention to address the above impairments.   Patients rehabilitation potential is considered to be Good  Factors which may influence rehabilitation potential include:   [ ]             None noted  [X]             Mental ability/status  [ ]             Medical condition  [ ]             Home/family situation and support systems  [ ]             Safety awareness  [ ]             Pain tolerance/management  [ ]             Other:        PLAN :  Recommendations and Planned Interventions:  [X]               Self Care Training                  [X]        Therapeutic Activities  [X]               Functional Mobility Training    [ ]        Cognitive Retraining  [X]               Therapeutic Exercises           [X]        Endurance Activities  [X]               Balance Training                   [ ]        Neuromuscular Re-Education  [ ]               Visual/Perceptual Training     [X]   Home Safety Training  [X]               Patient Education                 [X]        Family Training/Education  [ ]               Other (comment):     Frequency/Duration: Patient will be followed by occupational therapy 4 times a week to address goals. Discharge Recommendations: Leonidas Lyn  Further Equipment Recommendations for Discharge: TBD by SNF       SUBJECTIVE:   Patient stated Preston Lima.       OBJECTIVE DATA SUMMARY:   HISTORY:   Past Medical History   Diagnosis Date    Abnormal chest CT 9/2012       RUL spiculated opacity     Aortic aneurysm (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   MPOA granddaughter Patrick Alfa aware, decline further evaluation except blood pressure control given age    Orma Damme Arthritis      COPD (chronic obstructive pulmonary disease) ([facility] [address]         on chest CT    DEMENTIA      Do not resuscitate 8/3/2014    Dysphagia due to old stroke 9/2012       family declined tube feed    Essential tremor      GERD (gastroesophageal reflux disease)      HTN (hypertension)      Hx of completed stroke      Hypercholesteremia      Liver mass 8/3/2014    Macular degeneration       Past Surgical History   Procedure Laterality Date    Hx hysterectomy        Hx orthopaedic           Right Shoulder surgery, Left wrist and Right elbow repair from Fractures    Hx cataract removal Bilateral      Hx colostomy take down        Hx colostomy        Hx carotid endarterectomy Left      Hx breast biopsy Bilateral          Prior Level of Function/Home Situation: Cyndee Rockwell Environment: Assisted living  [address] [place] [address] [place] Name: [address] [place] [address],[address]  # Steps to Enter: 0  One/Two Story Residence: One story  Living Alone: No  Support Systems: Assisted living, Family member(s), Child(kymberly)  Patient Expects to be Discharged to[de-identified] Assisted living  Current DME Used/Available at Home: Alveta Gallery, rolling  Tub or Shower Type: Shower  [X]  Right hand dominant             [ ]  Left hand dominant     EXAMINATION OF PERFORMANCE DEFICITS:  Cognitive/Behavioral Status:  Neurologic State: Alert;Eyes open to voice; Eyes open spontaneously  Orientation Level: Unable to verbalize     Skin: thin, with darkening skin in BLE distally     Edema: uppers none     Vision/Perceptual:    Acuity:  (functional for basic ADL tasks)       Range of Motion:  AROM: Generally decreased, functional- BUE     Strength:  Strength: Generally decreased, functional     Coordination:  Coordination: Generally decreased, functional  Fine Motor Skills-Upper: Left Intact; Right Intact    Gross Motor Skills-Upper: Left Intact; Right Intact      Tone & Sensation:  Tone: Normal- BUE  Sensation: Intact- BUE        Balance:  Sitting: Impaired  Sitting - Static: Fair (occasional)  Sitting - Dynamic: Fair (occasional)  Standing: Impaired; With support  Standing - Static: Fair;Constant support  Standing - Dynamic : Fair     Functional Mobility and Transfers for ADLs:  Bed Mobility:  Rolling: Moderate assistance  Supine to Sit: Moderate assistance  Scooting: Moderate assistance     Transfers:  Sit to Stand:  Moderate assistance  Stand to Sit: Minimum assistance  Bed to Chair: Moderate assistance;Assist x1 ADL Assessment:  Feeding: Minimum assistance  Oral Facial Hygiene/Grooming: Minimum assistance  Bathing: Maximum assistance  Upper Body Dressing: Minimum assistance  Lower Body Dressing: Maximum assistance  Toileting: Maximum assistance           ADL Intervention and task modifications:     Provided education through verbal cues with additional time and additional cuing. Needing cues for sequencing, safety and body mechanics for bed mobility, self care transfer at  level, and combing hair in support sitting following transfer. Patient sitting with fair static balance needing additional time for positioning to scoot forward for erect posture. Cuing needed for hand placement and safety sit <> stand at [facility] for self care transfer. Once in chair, completed grooming with alternating hands to comb hair. Grooming  Brushing/Combing Hair: Contact guard assistance (additional time and cuing)     Functional Measure:  Barthel Index:      Bathin  Bladder: 5  Bowels: 5  Groomin  Dressin  Feedin  Mobility: 0  Stairs: 0  Toilet Use: 5  Transfer (Bed to Chair and Back): 10  Total: 35         Barthel and G-code impairment scale:  Percentage of impairment CH  0% CI  1-19% CJ  20-39% CK  40-59% CL  60-79% CM  80-99% CN  100%   Barthel Score 0-100 100 99-80 79-60 59-40 20-39 1-19    0   Barthel Score 0-20 20 17-19 13-16 9-12 5-8 1-4 0      The Barthel ADL Index: Guidelines  1. The index should be used as a record of what a patient does, not as a record of what a patient could do. 2. The main aim is to establish degree of independence from any help, physical or verbal, however minor and for whatever reason. 3. The need for supervision renders the patient not independent. 4. A patient's performance should be established using the best available evidence. Asking the patient, friends/relatives and nurses are the usual sources, but direct observation and common sense are also important.  However direct testing is not needed. 5. Usually the patient's performance over the preceding 24-48 hours is important, but occasionally longer periods will be relevant. 6. Middle categories imply that the patient supplies over 50 per cent of the effort. 7. Use of aids to be independent is allowed. Kenton Warren., Barthel, D.W. (1470). Functional evaluation: the Barthel Index. [facility] (14)2. Ty Lee esteban GAGAN Pink, Marly Anne, Desmond Grady., Evans, [address] [place] [address] (1999). Measuring the change indisability after inpatient rehabilitation; comparison of the responsiveness of the Barthel Index and Functional Sanford Measure. Journal of Neurology, Neurosurgery, and Psychiatry, 66(4), 238-405. DUSTIN Schumacher, ARJUN Painter, & Shanelle Rosenberg M.A. (2004.) Assessment of post-stroke quality of life in cost-effectiveness studies: The usefulness of the Barthel Index and the EuroQoL-5D. Quality of Life Research, 13, 681-98         G codes: In compliance with CMSs Claims Based Outcome Reporting, the following G-code set was chosen for this patient based on their primary functional limitation being treated: The outcome measure chosen to determine the severity of the functional limitation was the Barthel Index with a score of 35/100 which was correlated with the impairment scale.       · Self Care:               - CURRENT STATUS:    CL - 60%-79% impaired, limited or restricted               - GOAL STATUS:           CK - 40%-59% impaired, limited or restricted               - D/C STATUS:                       ---------------To be determined---------------      Occupational Therapy Evaluation Charge Determination   History Examination Decision-Making   LOW Complexity : Brief history review  MEDIUM Complexity : 3-5 performance deficits relating to physical, cognitive , or psychosocial skils that result in activity limitations and / or participation restrictions MEDIUM Complexity : Patient may present with comorbidities that affect occupational performnce. Miniml to moderate modification of tasks or assistance (eg, physical or verbal ) with assesment(s) is necessary to enable patient to complete evaluation       Based on the above components, the patient evaluation is determined to be of the following complexity level: LOW   Pain:  Pain Scale 1: Adult Nonverbal Pain Scale  Pain Intensity 1: 0     Activity Tolerance:   2 L O2. After treatment:   [X] Patient left in no apparent distress sitting up in chair  [ ] Patient left in no apparent distress in bed  [X] Call bell left within reach  [X] Nursing notified  [X] Caregiver present  [ ] Bed alarm activated      COMMUNICATION/EDUCATION:   The patients plan of care was discussed with: Physical Therapist, Registered Nurse and Patient. [X] Home safety education was provided and the patient/caregiver indicated understanding. [X] Patient/family have participated as able in goal setting and plan of care. [X] Patient/family agree to work toward stated goals and plan of care. [ ] Patient understands intent and goals of therapy, but is neutral about his/her participation. [ ] Patient is unable to participate in goal setting and plan of care. This patients plan of care is appropriate for delegation to [facility].      Thank you for this referral.  Maine Ndiaye, OT  Time Calculation: 19 mins

## 2017-02-21 NOTE — PROGRESS NOTES
Oncology Nursing Communication Tool      5:13 PM  2/21/2017     Bedside shift change report given to Madhavi Sandoval RN (incoming nurse) by Lillian Haley RN (outgoing nurse) on Merced Loveless a 80 y.o. female who was admitted on 2/20/2017  2:35 PM. Report included the following information SBAR, Kardex, ED Summary, Intake/Output, MAR and Recent Results. Significant changes during shift: none      Issues for physician to address: none           Code Status: DNR     Infections: ESBL     Allergies: Codeine and Sulfa (sulfonamide antibiotics)     Current diet: DIET DYSPHAGIA MECH ALTERED (NDD2)       Pain Controlled [x] yes [] no   Bowel Movement [] yes [x] no   Last Bowel Movement (date)     Unable to recall            Vital Signs:   Patient Vitals for the past 12 hrs:   Temp Pulse Resp BP SpO2   02/21/17 1519 98 °F (36.7 °C) 76 18 145/90 97 %      Intake & Output:   No intake or output data in the 24 hours ending 02/21/17 1713   Laboratory Results:   No results found for this or any previous visit (from the past 12 hour(s)). Opportunity for questions and clarifications were given to the incoming nurse. Patient's bed is in low position, side rails x2, door open PRN, call bell within reach and patient not in distress.       Lillian Haley RN

## 2017-02-21 NOTE — PROGRESS NOTES
TRANSFER - IN REPORT:    Verbal report received from [address] [place] (name) on Vasyl Go  being received from ED (unit) for routine progression of care      Report consisted of patients Situation, Background, Assessment and   Recommendations(SBAR). Information from the following report(s) SBAR, Kardex, ED Summary, Intake/Output, MAR and Recent Results was reviewed with the receiving nurse. Opportunity for questions and clarification was provided. Assessment completed upon patients arrival to unit and care assumed.

## 2017-02-21 NOTE — ED NOTES
TRANSFER - OUT REPORT:    Verbal report given to JOSE Flores(name) on KATTY WHITAKER  being transferred to Oncology(unit) for routine progression of care       Report consisted of patients Situation, Background, Assessment and   Recommendations(SBAR). Information from the following report(s) SBAR and ED Summary was reviewed with the receiving nurse. Lines:   Peripheral IV 02/20/17 Right Arm (Active)   Site Assessment Clean, dry, & intact 2/20/2017  3:55 PM   Phlebitis Assessment 0 2/20/2017  3:55 PM   Infiltration Assessment 0 2/20/2017  3:55 PM   Dressing Status Clean, dry, & intact 2/20/2017  3:55 PM   Dressing Type Transparent 2/20/2017  3:55 PM   Hub Color/Line Status Pink 2/20/2017  3:55 PM        Opportunity for questions and clarification was provided.       Patient transported with:   O2 @ 2 liters

## 2017-02-21 NOTE — PROGRESS NOTES
Nutrition Services      Nutrition Screen:  Wt Readings from Last 10 Encounters:   02/21/17 40 kg (88 lb 3.2 oz)   12/26/16 49.4 kg (108 lb 14.5 oz)   07/17/16 49.4 kg (109 lb)   12/01/15 51 kg (112 lb 7 oz)   10/22/15 53.1 kg (117 lb)   09/28/15 53.4 kg (117 lb 12.8 oz)   09/28/15 54 kg (119 lb)   12/19/14 59 kg (130 lb)   11/16/14 61.2 kg (135 lb)   09/23/14 60.8 kg (134 lb)     Body mass index is 14.24 kg/(m^2). Supplements:                        _____ ordered ______  declined. __ __  Pt is nutritionally stable at this time, will rescreen in 7 days. __ __    Pt is at nutritional risk and will be rescreened in  days. __x __  Pt is at moderate or high nutritional risk, will refer to RD for assessment &/or plan.        Jillian Johnson  Dietetic Technician, Registered

## 2017-02-21 NOTE — WOUND CARE
WOUND CARE CONSULT:  Consult by nurse request for stage I pressure injury. Chart reviewed, patient assessed. Admitted for fatigue with a recent finding of lung mass, with a history of aortic aneurysm, COPD, dementia, dysphagia, essential tremor, GERD, HTN, stroke, hypercholesterolemia, liver mass and macular degeneration. Patient is a very thin woman, her sacrum is blanchable erythema. She also has two linear lines on the right buttock/lower right back that also sergei. Staff have applied a sacral border dressing. Patient turned to the right side. Recommendations:    Turn every 2 hours from left to right. Keep off back. Float heels at all times while in bed.     Irvin Councilman, RN, BSN, Wound Care Nurse

## 2017-02-21 NOTE — PROGRESS NOTES
Pharmacy  [facility] Monitoring     Enoxaparin Indication: DVT Prophylaxis  Current Dose: 25 mg Daily  Creatinine Clearance: 20.7    Recent Labs      02/21/17   0454  02/20/17   1551   CREA  1.05*  1.31*   BUN  30*  35*   HGB  14.1  15.0   PLT  228  193     Wt Readings from Last 1 Encounters:   02/21/17 40 kg (88 lb 3.2 oz)   Body mass index is 14.24 kg/(m^2). Impression/Plan: Weight 40 kg, creatinine clearance 20.7 ml/min. Dose adjusted to 20 mg daily per protocol.      Thanks,  Sirena Rodriguez, [facility]

## 2017-02-21 NOTE — PROGRESS NOTES
Oncology Nursing Communication Tool      7:31 PM  2/21/2017     Bedside shift change report given to Ryan Thomson RN (incoming nurse) by Steff Yanez RN (outgoing nurse) on Celestine Coto a 80 y.o. female who was admitted on 2/20/2017  2:35 PM. Report included the following information SBAR, Kardex, Procedure Summary, Intake/Output, MAR, Accordion, Recent Results and Med Rec Status. Significant changes during shift: Hospice consult today. Received 2 of 3 runs of IV potassium--will need third from oncoming shift. Issues for physician to address: None           Code Status: DNR     Infections: ESBL     Allergies: Codeine and Sulfa (sulfonamide antibiotics)     Current diet: DIET DYSPHAGIA MECH ALTERED (NDD2)       Pain Controlled [x] yes [] no   Bowel Movement [] yes [x] no   Last Bowel Movement (date) ?             Vital Signs:   Patient Vitals for the past 12 hrs:   Temp Pulse Resp BP SpO2   02/21/17 0448 97 °F (36.1 °C) 80 18 (!) 159/98 93 %      Intake & Output:   No intake or output data in the 24 hours ending 02/21/17 1126   Laboratory Results:     Recent Results (from the past 12 hour(s))   CBC W/O DIFF    Collection Time: 02/21/17  4:54 AM   Result Value Ref Range    WBC 7.1 3.6 - 11.0 K/uL    RBC 5.11 3.80 - 5.20 M/uL    HGB 14.1 11.5 - 16.0 g/dL    HCT 42.1 35.0 - 47.0 %    MCV 82.4 80.0 - 99.0 FL    MCH 27.6 26.0 - 34.0 PG    MCHC 33.5 30.0 - 36.5 g/dL    RDW 14.0 11.5 - 14.5 %    PLATELET 805 336 - 365 K/uL   MAGNESIUM    Collection Time: 02/21/17  4:54 AM   Result Value Ref Range    Magnesium 2.3 1.6 - 2.4 mg/dL   METABOLIC PANEL, COMPREHENSIVE    Collection Time: 02/21/17  4:54 AM   Result Value Ref Range    Sodium 137 136 - 145 mmol/L    Potassium 3.2 (L) 3.5 - 5.1 mmol/L    Chloride 102 97 - 108 mmol/L    CO2 27 21 - 32 mmol/L    Anion gap 8 5 - 15 mmol/L    Glucose 177 (H) 65 - 100 mg/dL    BUN 30 (H) 6 - 20 MG/DL    Creatinine 1.05 (H) 0.55 - 1.02 MG/DL    BUN/Creatinine ratio 29 (H) 12 - 20      GFR est AA 59 (L) >60 ml/min/1.73m2    GFR est non-AA 49 (L) >60 ml/min/1.73m2    Calcium 8.8 8.5 - 10.1 MG/DL    Bilirubin, total 0.7 0.2 - 1.0 MG/DL    ALT (SGPT) 12 12 - 78 U/L    AST (SGOT) 15 15 - 37 U/L    Alk. phosphatase 44 (L) 45 - 117 U/L    Protein, total 6.7 6.4 - 8.2 g/dL    Albumin 2.8 (L) 3.5 - 5.0 g/dL    Globulin 3.9 2.0 - 4.0 g/dL    A-G Ratio 0.7 (L) 1.1 - 2.2     PHOSPHORUS    Collection Time: 02/21/17  4:54 AM   Result Value Ref Range    Phosphorus 2.3 (L) 2.6 - 4.7 MG/DL              Opportunity for questions and clarifications were given to the incoming nurse. Patient's bed is in low position, side rails x2, door open PRN, call bell within reach and patient not in distress.       Michelle Bradford, RN

## 2017-02-21 NOTE — PROGRESS NOTES
Problem: Mobility Impaired (Adult and Pediatric)  Goal: *Acute Goals and Plan of Care (Insert Text)  Physical Therapy Goals  Initiated 2/21/2017  1. Patient will move from supine to sit and sit to supine , scoot up and down and roll side to side in bed with supervision/set-up within 7 day(s). 2. Patient will transfer from bed to chair and chair to bed with supervision/set-up using the least restrictive device within 7 day(s). 3. Patient will perform sit to stand with supervision/set-up within 7 day(s). 4. Patient will ambulate with supervision/set-up for 50 feet with the least restrictive device within 7 day(s). PHYSICAL THERAPY EVALUATION  Patient: Stephy Wallace (80 y.o. female)  Date: 2/21/2017  Primary Diagnosis: fatigue        Precautions:   Contact, Fall      ASSESSMENT :  Based on the objective data described below, the patient presents with impaired functional mobility secondary to increased weakness, sitting and standing balance deficits, impaired gait mechanics, decreased activity tolerance/endurance, impaired command following and impaired motor planning on top of baseline dementia. Prior to admission, pt was residing at [facility], ambulating with rollator walker, and denies history of falls. Pt also denies home O2 use. Family states that pt has not been getting out of bed over previous 1 week as she began to not feel well. Today, pt received supine in bed w/ family present in room and agreeable to participation with therapy. Pt required modAx1 in order to assume seated position EOB w/ fair sitting balance EOB secondary to R posterolateral trunk lean. ModA required for pt to maintain seated position. Pt sit<>stand w/ modAx1, requiring modA to facilitate forward weight shift/prevent posterior LOB as pt exhibiting posterior trunk lean throughout transfer. Once standing, pt required modAx1 in order to ambulate 2 ft from EOB to bedside chair w/ RW.  Pt exhibits slow shuffled gait with posterior trunk lean noted (mod A to correct) as well as kyphotic posture. Increased VC for motor planning required throughout mobility as well as for pt to remain on task. VC also required for safety awareness throughout. Pt is a significant falls risk and should continue to have 1-2 person assist w/ use of RW during all OOB mobility. Upon discharge, recommend pt return to [facility] however to SNF portion (family requesting pt return to SNF) Will plan to continue progressing mobility, ambulation, and overall activity tolerance as pt was mod I with use of rollator walker and able to ambulate distance from room to dining sutton. Patient will benefit from skilled intervention to address the above impairments. Patients rehabilitation potential is considered to be Good  Factors which may influence rehabilitation potential include:   [ ]         None noted  [X]         Mental ability/status  [ ]         Medical condition  [ ]         Home/family situation and support systems  [ ]         Safety awareness  [ ]         Pain tolerance/management  [ ]         Other:        PLAN :  Recommendations and Planned Interventions:  [X]           Bed Mobility Training             [ ]    Neuromuscular Re-Education  [X]           Transfer Training                   [ ]    Orthotic/Prosthetic Training  [X]           Gait Training                         [ ]    Modalities  [X]           Therapeutic Exercises           [ ]    Edema Management/Control  [X]           Therapeutic Activities            [ ]    Patient and Family Training/Education  [ ]           Other (comment):     Frequency/Duration: Patient will be followed by physical therapy  4 times a week to address goals. Discharge Recommendations: Leonidas Lyn - return to [facility]  Further Equipment Recommendations for Discharge: TBD       SUBJECTIVE:   Patient stated That's my granddaughter.       OBJECTIVE DATA SUMMARY:   HISTORY:    Past Medical History   Diagnosis Date  Abnormal chest CT 9/2012       RUL spiculated opacity     Aortic aneurysm ([address] 8/3/2014       Aortic arch aneurysm with partial thrombosis seen on chest CT 8/2/2014. MPOA granddaughter Adebayo aware, decline further evaluation except blood pressure control given age    [facility] Arthritis      COPD (chronic obstructive pulmonary disease) [address] [address] [facility]         on chest CT    DEMENTIA      Do not resuscitate 8/3/2014    Dysphagia due to old stroke 9/2012       family declined tube feed    Essential tremor      GERD (gastroesophageal reflux disease)      HTN (hypertension)      Hx of completed stroke      Hypercholesteremia      Liver mass 8/3/2014    Macular degeneration       Past Surgical History   Procedure Laterality Date    Hx hysterectomy        Hx orthopaedic           Right Shoulder surgery, Left wrist and Right elbow repair from Fractures    Hx cataract removal Bilateral      Hx colostomy take down        Hx colostomy        Hx carotid endarterectomy Left      Hx breast biopsy Bilateral       Prior Level of Function/Home Situation: Pt lives at [address] Ambulates with rollator walker. Denies history of falls. States that she is typically able to perform sit<>stand transfer from EOB or chair and ambulate to bathroom with use of rollator walker at mod I level. Denies home O2 use.  Hx of dementia  Personal factors and/or comorbidities impacting plan of care: baseline dementia, uti     Home Situation  [facility] [address] Name: [address] [place] [address],[address] Floor  # Steps to Enter: 0  One/Two Story Residence: One story  Living Alone: No  Support Systems: Assisted living, Family member(s), Child(kymberly)  Patient Expects to be Discharged to[de-identified] Assisted living  Current DME Used/Available at Home: Walker, rolling     EXAMINATION/PRESENTATION/DECISION MAKING:   Critical Behavior:  Neurologic State: Alert, Eyes open to voice  Orientation Level: Unable to verbalize        Skin:  Intact   Strength: Strength: Generally decreased, functional                    Tone & Sensation:   Tone: Normal              Sensation: Intact               Range Of Motion:  AROM: Generally decreased, functional                       Coordination:  Coordination: Generally decreased, functional     Functional Mobility:  Bed Mobility:  Rolling: Moderate assistance  Supine to Sit: Moderate assistance     Scooting: Moderate assistance  Transfers:  Sit to Stand: Moderate assistance  Stand to Sit: Minimum assistance        Bed to Chair: Moderate assistance;Assist x1              Balance:   Sitting: Impaired  Sitting - Static: Fair (occasional)  Sitting - Dynamic: Fair (occasional)  Standing: Impaired; With support  Standing - Static: Fair;Constant support  Standing - Dynamic : Fair  Ambulation/Gait Training:  Distance (ft): 2 Feet (ft)  Assistive Device: Walker, rolling;Gait belt  Ambulation - Level of Assistance: Moderate assistance;Assist x1        Gait Abnormalities: Decreased step clearance;Shuffling gait (posterior trunk lean, kyphotic posture)        Base of Support: Widened     Speed/Flower: Pace decreased (<100 feet/min); Shuffled  Step Length: Left shortened;Right shortened                             Pt ambulated 2ft from EOB to bedside chair w/ RW and modAx1, exhibiting slow shuffled gait with posterior trunk lean noted (mod A to correct) as well as kyphotic posture. Increased VC for motor planning required. Functional Measure:  Barthel Index:      Bathin  Bladder: 5  Bowels: 5  Groomin  Dressin  Feedin  Mobility: 0  Stairs: 0  Toilet Use: 5  Transfer (Bed to Chair and Back): 10  Total: 35         Barthel and G-code impairment scale:  Percentage of impairment CH  0% CI  1-19% CJ  20-39% CK  40-59% CL  60-79% CM  80-99% CN  100%   Barthel Score 0-100 100 99-80 79-60 59-40 20-39 1-19    0   Barthel Score 0-20 20 17-19 13-16 9-12 5-8 1-4 0      The Barthel ADL Index: Guidelines  1.  The index should be used as a record of what a patient does, not as a record of what a patient could do. 2. The main aim is to establish degree of independence from any help, physical or verbal, however minor and for whatever reason. 3. The need for supervision renders the patient not independent. 4. A patient's performance should be established using the best available evidence. Asking the patient, friends/relatives and nurses are the usual sources, but direct observation and common sense are also important. However direct testing is not needed. 5. Usually the patient's performance over the preceding 24-48 hours is important, but occasionally longer periods will be relevant. 6. Middle categories imply that the patient supplies over 50 per cent of the effort. 7. Use of aids to be independent is allowed. Viry Boudreaux., Barthel, DPacoW. (1776). Functional evaluation: the Barthel Index. [facility] (14)2. GAGAN Corcoran, Domonique Molina., [facility], [address] [place] [address] (1999). Measuring the change indisability after inpatient rehabilitation; comparison of the responsiveness of the Barthel Index and Functional [place] Measure. Journal of Neurology, Neurosurgery, and Psychiatry, 66(4), 438-908. Archie Sheppard, N.J.A, ARJUN Painter, & China Dailey, M.A. (2004.) Assessment of post-stroke quality of life in cost-effectiveness studies: The usefulness of the Barthel Index and the EuroQoL-5D. Quality of Life Research, 13, 568-45         G codes: In compliance with CMSs Claims Based Outcome Reporting, the following G-code set was chosen for this patient based on their primary functional limitation being treated: The outcome measure chosen to determine the severity of the functional limitation was the Barthel Index with a score of 35/100 which was correlated with the impairment scale.       · Mobility - Walking and Moving Around:               - CURRENT STATUS:    CL - 60%-79% impaired, limited or restricted  - GOAL STATUS:           CJ - 20%-39% impaired, limited or restricted               - D/C STATUS:                       ---------------To be determined---------------      Physical Therapy Evaluation Charge Determination   History Examination Presentation Decision-Making   MEDIUM  Complexity : 1-2 comorbidities / personal factors will impact the outcome/ POC  MEDIUM Complexity : 3 Standardized tests and measures addressing body structure, function, activity limitation and / or participation in recreation  MEDIUM Complexity : Evolving with changing characteristics  MEDIUM Complexity : FOTO score of 26-74      Based on the above components, the patient evaluation is determined to be of the following complexity level: MEDIUM     Pain:  Pain Scale 1: Adult Nonverbal Pain Scale  Pain Intensity 1: 0              Activity Tolerance:   VSS throughout on 2 LPM O2 via NC  Please refer to the flowsheet for vital signs taken during this treatment. After treatment:   [X]         Patient left in no apparent distress sitting up in chair  [ ]         Patient left in no apparent distress in bed  [X]         Call bell left within reach  [X]         Nursing notified  [X]         Caregiver present  [ ]         Bed alarm activated      COMMUNICATION/EDUCATION:   The patients plan of care was discussed with: Occupational Therapist and Registered Nurse.  [X]         Fall prevention education was provided and the patient/caregiver indicated understanding. [X]         Patient/family have participated as able in goal setting and plan of care. [X]         Patient/family agree to work toward stated goals and plan of care. [ ]         Patient understands intent and goals of therapy, but is neutral about his/her participation. [ ]         Patient is unable to participate in goal setting and plan of care.      Thank you for this referral.  Mando Gregorio, PT, DPT   Time Calculation: 21 mins

## 2017-02-21 NOTE — ED NOTES
Pt taken to floor by transport, pt opens eyes to verbal response, oriented x2, skin warm and dry, IV infusing without redness or swelling at site

## 2017-02-21 NOTE — HOSPICE
[facility]  [phone]     Patient Name: Sreekanth Thao  YOB: 1922  Age: 80 y.o. [address] [facility] RN Note:  Hospice consult received, reviewing chart. Will follow up with Unit Nurse and Care Manager to discuss plan of care, patient status and discharge disposition within the hour. Will meet with daughter upon arrival.    Thank you for the opportunity to be of service to this patient. 3:15 PM Anticipated arrival of patient daughter around 2 pm but she has not arrived at this time. VM left requested return call to schedule meeting time. 3:25 PM Received call back from patient daughter Oliver Castle who states they are in their RV on the way back from [place] and they have to drop the RV off in [facility] before they can get to the hospital which would be 5:30PM at the earliest. Scheduled hospice meeting tomorrow morning between 10-11.

## 2017-02-21 NOTE — PROGRESS NOTES
46 Paged Dr. Susanna Acevedo. Received order for oral potassium; however, patient lethargic. Eyes open to voice but minimal command following. Unable to swallow potassium pills. [address] to Dr. Susanna Acevedo. He stated he would put in new order for IV potassium. Will also change diet order as patient is having difficulty swallowing. Per granddaughter, able to eat food with applesauce consistency. 1400 When hanging second bag of potassium, noticed IV leaking and appeared infiltrated. Tender to touch. Stopped IV infusion. Attempted to resite IV on opposite arm, unsuccessful. Informed charge nurse who will attempt.     [phone] nurse attempted x 2, unsuccessful. Spoke to [facility] team who will attempt this afternoon.

## 2017-02-22 NOTE — PROGRESS NOTES
Attempted to see patient for OT treatment. Upon arrival she was sleeping soundly. Family indicate that she had a lot of company this morning, and that she is exhausted. Patient did wake up with verbal and tactile stimuli, but requested to try again later. Will defer for now, but continue to follow.

## 2017-02-22 NOTE — PROGRESS NOTES
Oncology Nursing Communication Tool      8:09 AM  2/22/2017     Bedside and Verbal shift change report given to Tuyet Guo RN (incoming nurse) by Rosalba Montero (outgoing nurse) on Agapito Patient a 80 y.o. female who was admitted on 2/20/2017  2:35 PM. Report included the following information SBAR, Kardex, Intake/Output, MAR and Recent Results. Significant changes during shift: None      Issues for physician to address: None          Code Status: DNR     Infections: ESBL     Allergies: Codeine and Sulfa (sulfonamide antibiotics)     Current diet: DIET DYSPHAGIA MECH ALTERED (NDD2)       Pain Controlled [x] yes [] no   Bowel Movement [] yes [x] no   Last Bowel Movement (date)                  Vital Signs:   Patient Vitals for the past 12 hrs:   Temp Pulse Resp BP SpO2   02/22/17 0518 98.1 °F (36.7 °C) 72 16 (!) 153/93 100 %      Intake & Output:   No intake or output data in the 24 hours ending 02/22/17 0809   Laboratory Results:     Recent Results (from the past 12 hour(s))   METABOLIC PANEL, BASIC    Collection Time: 02/22/17  4:00 AM   Result Value Ref Range    Sodium 138 136 - 145 mmol/L    Potassium 4.1 3.5 - 5.1 mmol/L    Chloride 104 97 - 108 mmol/L    CO2 25 21 - 32 mmol/L    Anion gap 9 5 - 15 mmol/L    Glucose 156 (H) 65 - 100 mg/dL    BUN 20 6 - 20 MG/DL    Creatinine 0.77 0.55 - 1.02 MG/DL    BUN/Creatinine ratio 26 (H) 12 - 20      GFR est AA >60 >60 ml/min/1.73m2    GFR est non-AA >60 >60 ml/min/1.73m2    Calcium 8.3 (L) 8.5 - 10.1 MG/DL   MAGNESIUM    Collection Time: 02/22/17  4:00 AM   Result Value Ref Range    Magnesium 2.2 1.6 - 2.4 mg/dL              Opportunity for questions and clarifications were given to the incoming nurse. Patient's bed is in low position, side rails x2, door open PRN, call bell within reach and patient not in distress.       Rosalba Montero

## 2017-02-22 NOTE — PROGRESS NOTES
Hospitalist Progress Note    NAME: Marko Mattson   :  1922   MRN:  [id number]       Interim Hospital Summary: 80 y.o. female whom presented on 2017 with      Assessment / Plan:  Severe protein calorie malnutrition / weight loss / Anorexia / FTT (failure to thrive) in adult / Abnormal chest CT with RUL spiculated opacity -POA  -Likely secondary to lung cancer, family has refused further work up  -Diet as tolerated with supplements.  -Discontinue IVF today in setting of improved mentation. Electrolytes wnl.  -Family meeting with hospice today to address goals of care. Fatigue / Debility / Arthritis  -POA, multifactorial, DDx dehydration, aging, deconditioning, cancer, lost muscle mass, arthritis. -Fall precautions,   -PT/OT eval - rec return to SNF  -Hospice to meet with family today    Hx of cva / Dysphagia S/P CVA / Dementia  -Supportive care.   -hold ASA if going towards comfort and hospice. ARF (acute renal failure) / Dehydration   -creat 1.31 POA due to poor PO intake. -Received IVF overnight. Now with improvement in creatinine. -DC IVF in setting of increased SOB and stable BMP  -Encourage PO intake. -Readdress goals, follow BMP.     UTI, GNR  -UA concerning for UTI  -hx of recurrent UTI, often resistant. Seen by urology in the past and told by daughter nothing further to do. -IV ceftriaxone day 2 for now. -Follow culture     Acute encephalopathy- improved  - Patient lethargic, less responsive per daughter  -likely secondary to UTI/dehydration vs progression of suspected lung ca.  - patient oriented x 3 but easily confused. -monitor, supportive care as above. Hypokalemia -resolved  -replete, monitore    HTN  -stable  -Would stop testing or treating     COPD-Noted on chest CT, but not on meds. No wheezing. Requiring supplemental O2, wean as tolerated.   GERD  Macular degeneration  Hypercholesteremia     Ct chest 16- Chronic type B thoracic aortic aneurysm of the aortic arch, which has increased from 5.4 cm to 6.4 cm since 2014. Code status: DNR  Prophylaxis: Lovenox  Recommended Disposition: TBD     Subjective:     Chief Complaint / Reason for Physician Visit  Patient more awake this morning. Oriented x 3. Denies pain. Has some SOB, remains on 2L NC. Family in room updated. Plan to met with hospice today to address goals of care. Discussed with RN events overnight. Review of Systems:  Symptom Y/N Comments  Symptom Y/N Comments   Fever/Chills n   Chest Pain n    Poor Appetite y   Edema n    Cough    Abdominal Pain n    Sputum    Joint Pain     SOB/HEARD y   Pruritis/Rash     Nausea/vomit n   Tolerating PT/OT     Diarrhea    Tolerating Diet     Constipation    Other       Could NOT obtain due to:      Objective:     VITALS:   Last 24hrs VS reviewed since prior progress note. Most recent are:  Patient Vitals for the past 24 hrs:   Temp Pulse Resp BP SpO2   02/22/17 0518 98.1 °F (36.7 °C) 72 16 (!) 153/93 100 %   02/21/17 1520 - - - - 97 %   02/21/17 1519 98 °F (36.7 °C) 76 18 145/90 97 %       Intake/Output Summary (Last 24 hours) at 02/22/17 1032  Last data filed at 02/22/17 0938   Gross per 24 hour   Intake              100 ml   Output                0 ml   Net              100 ml        PHYSICAL EXAM:  General: Thin. Awake, alert, no acute distress  , 2l NC, hard of hearing. EENT:  Anicteric sclerae. EOMI, Dry MM  Resp:  Bilateral crackles, no wheezing. No accessory muscle use  CV:  Regular  rhythm,  No edema  GI:  Soft, Non distended, Non tender.  +Bowel sounds  Neurologic:  Aaox3, easily confused. Psych:   Poor insight, not agitated. Skin:  Scattered ecchymosis of bilateral LE.     Reviewed most current lab test results and cultures  YES  Reviewed most current radiology test results   YES  Review and summation of old records today    NO  Reviewed patient's current orders and MAR    YES  PMH/SH reviewed - no change compared to H&P  ________________________________________________________________________  Care Plan discussed with:    Comments   Patient y    Family  y family   RN y    Care Manager     Consultant                        Multidiciplinary team rounds were held today with , nursing, pharmacist and clinical coordinator. Patient's plan of care was discussed; medications were reviewed and discharge planning was addressed. ________________________________________________________________________  Total NON critical care TIME:  30   Minutes    Total CRITICAL CARE TIME Spent:   Minutes non procedure based      Comments   >50% of visit spent in counseling and coordination of care     ________________________________________________________________________  Deja Young MD     Procedures: see electronic medical records for all procedures/Xrays and details which were not copied into this note but were reviewed prior to creation of Plan. LABS:  I reviewed today's most current labs and imaging studies.   Pertinent labs include:  Recent Labs      02/21/17   0454  02/20/17   1551   WBC  7.1  6.7   HGB  14.1  15.0   HCT  42.1  44.2   PLT  228  193     Recent Labs      02/22/17   0400  02/21/17   0454  02/20/17   1551   NA  138  137  138   K  4.1  3.2*  3.3*   CL  104  102  101   CO2  25  27  26   GLU  156*  177*  139*   BUN  20  30*  35*   CREA  0.77  1.05*  1.31*   CA  8.3*  8.8  9.1   MG  2.2  2.3  2.5*   PHOS   --   2.3*   --    ALB   --   2.8*  3.2*   TBILI   --   0.7  0.7   SGOT   --   15  20   ALT   --   12  14   INR   --    --   1.2*       Signed: )Deja Young MD

## 2017-02-22 NOTE — PROGRESS NOTES
Letter of Status Determination:   Recommend hospitalization status upgraded from Observation to Inpatient  Status    Pt Name:  Haydee Carson   MR#  [id number]   [facility]#   [id number]   [address] [place] [address]  1115/01  @ [facility]   Admit date  2/20/2017  2:35 PM   Current Attending Physician  Gem Solorio MD   Principal diagnosis  UTI (urinary tract infection)      Clinicals  80 y.o. y.o  female hospitalized with above diagnosis   This lady is declining in her health. This episode of hospital care has lead to diagnosis possible lung tumor, severe malnutrition (Body mass index is 14.94 kg/(m^2). ), failure to thrive in an adult. She was found encephalopathic suspected to have come  from UTI    Urine culture is growing GNR >100k colonies. [place] (MCG) criteria   Does   apply Urinary Complications: Common Complications and Conditions  CCC: CCC-039 (ISC)  Altered mental status    STATUS DETERMINATION  Based on documented presenting clinical data, comorbid conditions, high risk of adverse events and deterioration, it is our recommendation that the patient's status should be upgraded from OBSERVATION to INPATIENT status. The final decision of the patient's hospitalization status depends on the attending physician's judgment.          Additional comments       Payor: [facility] COORD CARE / Plan: [place] DUAL [facility] CARE / Product Type: Managed Care Medicare /         Skylar Payan MD MPH FACP     Physician Advisor    [id number] [facility],[address] Documentation Management Program  [address]   President Medical Staff, [address] [place] [address]    Cell  [phone]                Insurance Information                [facility] COORD [address] Phone: Subscriber: Kortney Jaimes Subscriber#: [id number]     Group#:  Precert#:         [facility] Phone:     Subscriber: Kortney Jaimes Subscriber#: DUAL    Group#:  Precert#:

## 2017-02-22 NOTE — HOSPICE
[facility]  [facility]  [phone]    Patient Name: Fred Abrams  YOB: 1922  Age: 80 y.o. [address] [facility] RN Note:  Hospice consult noted. Chart reviewed. Plan of care discussed with patients nurse & care manager. In to meet with patient, daughter Gisela Castillo, granddaughter, JOMAR and grandson . Discussed Hospice philosophy, general plan of care, levels of care, services and on call procedures. Family information packet provided & reviewed with Mrs. Maxwell Pitt. 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 Patient also diagnosed with lung mass about 3 years ago that they elected not to pursue further testing/treatment that has just recently began to show changes and growth. Over the last 2 weeks patient has had rapid decline. Patient was ambulating independently with walker, had a wonderful appetite. Since that time she is not longer able to stand independently, having increased confusion, poor appetite and lethargy. Family notes some improvement from yesterday. MD in this morning stating they are waiting on result from urine culture to determine specific pathogen which will likely be one of the resistant ones since she has hx of ESBL and will require broad spectrum abx. Due to her history UTI never fully clears, and is recurrent, no longer responsive to PO abx. Patient with unproductive cough this morning. Offered Chest xray following decision from meeting on whether they pursue aggressive treatment or proceed with hospice/comfort support. Met with family initially then with patient. Options provided to patient who had difficulty being able to make decision unknown whether this is due to difficulty processing vs. difficulty accepting EOL.  Daughter asked her mother if she would like to stay here with them or if she would like to go home to be with her late spouse and patient responded she wanted to stay here with them and be here with them, she is not ready to leave them. Family would like to proceed with current treatment, would like to attempt treating with abx identified once culture returns and would like to get chest xray. Would like to return to [facility] with hospice after receiving antibiotic for UTI. Daughter concerned as per contract with [facility] patient can only be away for 18 days a year then they risk losing her room and she will be at 18 days soon. Information passed to CM. Thank you for the opportunity to be of service to this patient.

## 2017-02-22 NOTE — PROGRESS NOTES
Chart reviewed. Attempted to see pt this AM for PT intervention. Upon arrival, pt was sleeping soundly with family present in room. Family reports that pt had a lot of company this AM and is exhausted. Attempted to arouse pt however pt unable to maintain eyes in open position to carry conversation with this author. Pt and family requested that therapy return later this afternoon. Will defer however continue to follow. Deondre Zapata.  KIKA HillsT

## 2017-02-22 NOTE — PROGRESS NOTES
Pressure Ulcer Prevention In basket Alert Received for Brian < 14 (moderate risk).      Suggested Care Plan/Interventions for Nursing  1. Complete Brian Pressure Ulcer Risk Scale and use sub scores to identify appropriate interventions. 2. Perform Assessment: skin, changes in LOC, visual cues for pain, monitor skin under medical devices  3. Respond to Reduced Sensory Perception: changes in LOC, check visual cues for pain, float heels, suspension boots, pressure redistribution bed/mattress/chair cushion, turning and reposition approximately every 2 hours (pillows & wedges), pad between skin to skin, turn & reposition  4. Manage Moisture: absorbent under pads, internal / external urinary device, internal /  external fecal device, minimize layers, contain wound drainage, access need for specialty bed, limit adult briefs, maintain skin hydration (lotion/cream), moisture barrier, offer toileting every hour  5. Promote Activity: increase time out of bed, chair cushion, PT/OT evaluation, trapeze to reposition, pressure redistribution bed/mattress/chair  6. Address Reduced Mobility: float heels / suspension boot, HOB 30 degrees or less, pressure redistribution bed/mattress/cushion, PT / OT evaluation, turn and reposition approximately every 2 hours (pillows & wedges)  7. Promote Nutrition: document food / fluid / supplement intake, encourage/assist with meals as needed  8. Reduce Friction and Shear: transferring/repositioning devices (lift/draw sheet), lift team/ patient mobility team, feet elevated on foot rest, minimize layers, foam dressing / transparent film / skin sealants, protective barrier creams and emollients, transfer aides (board, Jessi lift, ceiling lift, stand assist), HOB 30 degrees or less, trapeze to reposition.   Wound Care Team

## 2017-02-22 NOTE — PROGRESS NOTES
Initial Nutrition Assessment:    INTERVENTIONS/RECOMMENDATIONS:   · Meals/Snacks: General/healthful diet: Continue current diet. Encourage PO/fluids and assist with meals as needed   · Supplements: Commercial supplement: Ensure enlive BID, magic cup daily     ASSESSMENT:   Patient medically noted for malnutrition, fatigue, body aches, FTT, lung mass and UTI. Receiving a mechanically altered diet. History of weight loss and poor PO intake likely secondary to lung cancer. Patient sleeping at time of attempted visit. Lunch tray at bedside with minimal items eaten. Family arrived as I was leaving; they report she ate some ice cream for lunch. They also report she has received ensure PTA. Will provide ensure BID and also send magic cup to try as family reports she likes ice cream. Ensure BID + magic cup will provide an extra 990 kcal, 49g protein. Diet Order:  (Dysphagia mechanically altered (NDD2))   % Eaten:  Patient Vitals for the past 72 hrs:   % Diet Eaten   02/22/17 0938 15 %     Pertinent Medications: [x]Reviewed []Other: rocephin, lovenox, KCl, prn zofran   Pertinent Labs: [x]Reviewed []Other: Phos 2.3  Food Allergies: [x]None []Other   Last BM: 2/16   [x]Active     []Hyperactive  []Hypoactive       [] Absent BS  Skin:    [x] Intact   [] Incision  [] Breakdown  [] Other:    Anthropometrics:   Height: 5' 6\" (167.6 cm) Weight: 42 kg (92 lb 9.5 oz)   IBW (%IBW):   ( ) UBW (%UBW):   (  %)   Last Weight Metrics:  Weight Loss Metrics 2/22/2017 12/26/2016 7/17/2016 12/1/2015 10/22/2015 9/28/2015 9/28/2015   Today's Wt 92 lb 9.5 oz 108 lb 14.5 oz 109 lb 112 lb 7 oz 117 lb 117 lb 12.8 oz 119 lb   BMI 14.94 kg/m2 17.58 kg/m2 18.14 kg/m2 18.71 kg/m2 19.47 kg/m2 19.6 kg/m2 19.8 kg/m2       BMI: Body mass index is 14.94 kg/(m^2).     This BMI is indicative of:   [x]Underweight    []Normal    []Overweight    [] Obesity   [] Extreme Obesity (BMI>40)     Estimated Nutrition Needs (Based on):   1338 Kcals/day (BMR (837) x 1. 3AF +250kcal) , 50 g (1.2 g/kg bw) Protein  Carbohydrate: At Least 130 g/day  Fluids: 1350 mL/day (1ml/kcal)    Pt expected to meet estimated nutrient needs: [x]Yes []No    NUTRITION DIAGNOSES:   Problem:  Underweight   Inadequate kcal/protein intake Unintended Weight loss   Etiology: related to dementia, FTT, anorexia     Signs/Symptoms: as evidenced by BMI 14.9, PO <50% of meals   15.1% weight loss x 2 months     NUTRITION INTERVENTIONS:  Meals/Snacks: General/healthful diet   Supplements: Commercial supplement              GOAL:   PO intake >50% of meals/supplements next 3-5 days     LEARNING NEEDS (Diet, Food/Nutrient-Drug Interaction):    [x] None Identified   [] Identified and Education Provided/Documented   [] Identified and Pt declined/was not appropriate     Cultureal, Alevism, OR Ethnic Dietary Needs:    [x] None Identified   [] Identified and Addressed     [x] Interdisciplinary Care Plan Reviewed/Documented    [x] Discharge Planning: Regular/soft diet. Preferred ONS TID       MONITORING /EVALUATION:   Food/Nutrient Intake Outcomes:  Total energy intake  Physical Signs/Symptoms Outcomes: Weight/weight change, Electrolyte and renal profile    NUTRITION RISK:    [x] High              [] Moderate           []  Low  []  Minimal/Uncompromised    PT SEEN FOR:    []  MD Consult: []Calorie Count      []Diabetic Diet Education        []Diet Education     []Electrolyte Management     []General Nutrition Management and Supplements     []Management of Tube Feeding     []TPN Recommendations    [x]  RN Referral:  [x]MST score >=2     []Enteral/Parenteral Nutrition PTA     []Pregnant: Gestational DM or Multigestation     []Pressure Ulcer/Wound Care needs        []  Low BMI  [x]  DTR Referral       Erinn Alexis  Pager [phone]                 Weekend Pager [phone]

## 2017-02-22 NOTE — PROGRESS NOTES
Hospitalist Progress Note    NAME: Haydee Carson   :  1922   MRN:  [id number]       Interim Hospital Summary: 80 y.o. female whom presented on 2017 with      Assessment / Plan:  Severe protein calorie malnutrition / weight loss / Anorexia / FTT (failure to thrive) in adult / Abnormal chest CT with RUL spiculated opacity -POA  -Likely secondary to lung cancer, family has refused further work up  -Perhaps other severe disease, vs dementia. -Diet as tolerated with supplements.  -Continue IVF  -Daughter planning to meet with hospice tomorrow. Fatigue / Debility / Arthritis  -POA, multifactorial, DDx dehydration, aging, deconditioning, cancer, lost muscle mass, arthritis. -Fall precautions,   -PT/OT eval - rec return to SNF  -Hospice to meet with family tomorrow    Hx of cva / Dysphagia S/P CVA / Dementia  -Supportive care.   -hold ASA if going towards comfort and hospice. ARF (acute renal failure) / Dehydration   -creat 1.31 POA due to poor PO intake. -IVF, follow UOP and BMP     UTI  -UA concerning for UTI  -hx of recurrent UTI, often resistant. Seen by urology in the past and told by daughter nothing further to do. -start IV ceftriaxone for now. -Follow culture     Acute encephalopathy  -Patient lethargic, less responsive per daughter  -likely secondary to UTI/dehydration vs progression of suspected lung ca. -monitor, supportive care as above. Hypokalemia  -k 3.2  -replete, monitore    HTN  -stable  -Would stop testing or treating     COPD-Noted on chest CT, but not on meds. No wheezing. GERD  Macular degeneration  Hypercholesteremia     Ct chest 16- Chronic type B thoracic aortic aneurysm of the aortic arch, which has increased from 5.4 cm to 6.4 cm since 2014. Code status: DNR  Prophylaxis: Lovenox  Recommended Disposition: TBD     Subjective:     Chief Complaint / Reason for Physician Visit  Patient opens eyes to voice but not following commands.  Daughter at bedside said she has been minimally responsive since her arrival.  Discussed with RN events overnight. Review of Systems:  Symptom Y/N Comments  Symptom Y/N Comments   Fever/Chills    Chest Pain     Poor Appetite    Edema     Cough    Abdominal Pain     Sputum    Joint Pain     SOB/HEARD    Pruritis/Rash     Nausea/vomit    Tolerating PT/OT     Diarrhea    Tolerating Diet     Constipation    Other       Could NOT obtain due to: AMS     Objective:     VITALS:   Last 24hrs VS reviewed since prior progress note. Most recent are:  Patient Vitals for the past 24 hrs:   Temp Pulse Resp BP SpO2   02/21/17 1520 - - - - 97 %   02/21/17 1519 98 °F (36.7 °C) 76 18 145/90 97 %   02/21/17 0448 97 °F (36.1 °C) 80 18 (!) 159/98 93 %   02/20/17 2112 97.7 °F (36.5 °C) 81 16 129/80 97 %   02/20/17 2000 - 80 - 122/70 99 %       Intake/Output Summary (Last 24 hours) at 02/21/17 1948  Last data filed at 02/21/17 0630   Gross per 24 hour   Intake          1561.67 ml   Output                0 ml   Net          1561.67 ml        PHYSICAL EXAM:  General: thin. lethargic, no acute distress    EENT:  Anicteric sclerae. Dry MM  Resp:  CTA bilaterally, no wheezing or rales. No accessory muscle use  CV:  Regular  rhythm,  No edema  GI:  Soft, Non distended, Non tender.  +Bowel sounds  Neurologic:  Opens eyes to voice but not following command, quickly falls back to sleep. Psych:   Unable to assess  Skin:  Scattered ecchymosis of bilateral LE.     Reviewed most current lab test results and cultures  YES  Reviewed most current radiology test results   YES  Review and summation of old records today    NO  Reviewed patient's current orders and MAR    YES  PMH/SH reviewed - no change compared to H&P  ________________________________________________________________________  Care Plan discussed with:    Comments   Patient     Family  y daughter   RN y    Care Manager     Consultant                        Multidiciplinary team rounds were held today with case manager, nursing, pharmacist and clinical coordinator. Patient's plan of care was discussed; medications were reviewed and discharge planning was addressed. ________________________________________________________________________  Total NON critical care TIME:  30   Minutes    Total CRITICAL CARE TIME Spent:   Minutes non procedure based      Comments   >50% of visit spent in counseling and coordination of care     ________________________________________________________________________  Oswaldo Ruiz MD     Procedures: see electronic medical records for all procedures/Xrays and details which were not copied into this note but were reviewed prior to creation of Plan. LABS:  I reviewed today's most current labs and imaging studies.   Pertinent labs include:  Recent Labs      02/21/17   0454  02/20/17   1551   WBC  7.1  6.7   HGB  14.1  15.0   HCT  42.1  44.2   PLT  228  193     Recent Labs      02/21/17   0454  02/20/17   1551   NA  137  138   K  3.2*  3.3*   CL  102  101   CO2  27  26   GLU  177*  139*   BUN  30*  35*   CREA  1.05*  1.31*   CA  8.8  9.1   MG  2.3  2.5*   PHOS  2.3*   --    ALB  2.8*  3.2*   TBILI  0.7  0.7   SGOT  15  20   ALT  12  14   INR   --   1.2*       Signed: )Oswaldo Ruiz MD

## 2017-02-22 NOTE — PROGRESS NOTES
Oncology Nursing Communication Tool      2:55 PM  2/22/2017     Bedside shift change report given to Sivakumar Ramos RN (incoming nurse) by Abdelrahman Burleson RN (outgoing nurse) on [facility] a 80 y.o. female who was admitted on 2/20/2017  2:35 PM. Report included the following information SBAR, Kardex, Procedure Summary, Intake/Output, MAR, Accordion, Recent Results and Med Rec Status. Significant changes during shift: Hospice meeting today, IVF discontinued. Chest x-ray ordered. Issues for physician to address: None            Code Status: DNR     Infections: ESBL     Allergies: Codeine and Sulfa (sulfonamide antibiotics)     Current diet: DIET DYSPHAGIA MECH ALTERED (NDD2)       Pain Controlled [x] yes [] no   Bowel Movement [] yes [x] no   Last Bowel Movement (date) ? Vital Signs:   Patient Vitals for the past 12 hrs:   Temp Pulse Resp BP SpO2   02/22/17 0518 98.1 °F (36.7 °C) 72 16 (!) 153/93 100 %      Intake & Output:     Intake/Output Summary (Last 24 hours) at 02/22/17 1025  Last data filed at 02/22/17 1315   Gross per 24 hour   Intake              100 ml   Output                0 ml   Net              100 ml      Laboratory Results:     Recent Results (from the past 12 hour(s))   METABOLIC PANEL, BASIC    Collection Time: 02/22/17  4:00 AM   Result Value Ref Range    Sodium 138 136 - 145 mmol/L    Potassium 4.1 3.5 - 5.1 mmol/L    Chloride 104 97 - 108 mmol/L    CO2 25 21 - 32 mmol/L    Anion gap 9 5 - 15 mmol/L    Glucose 156 (H) 65 - 100 mg/dL    BUN 20 6 - 20 MG/DL    Creatinine 0.77 0.55 - 1.02 MG/DL    BUN/Creatinine ratio 26 (H) 12 - 20      GFR est AA >60 >60 ml/min/1.73m2    GFR est non-AA >60 >60 ml/min/1.73m2    Calcium 8.3 (L) 8.5 - 10.1 MG/DL   MAGNESIUM    Collection Time: 02/22/17  4:00 AM   Result Value Ref Range    Magnesium 2.2 1.6 - 2.4 mg/dL              Opportunity for questions and clarifications were given to the incoming nurse.  Patient's bed is in low position, side rails x2, door open PRN, call bell within reach and patient not in distress.       Fatuma Greenberg RN

## 2017-02-23 NOTE — PROGRESS NOTES
Problem: Dysphagia (Adult)  Goal: *Acute Goals and Plan of Care (Insert Text)  Speech Therapy Goals  Initiated 2/23/2017  1. Patient will tolerate mechanically altered diet with thin liquids without signs/symptoms of aspiration within 7 day(s). SPEECH LANGUAGE PATHOLOGY BEDSIDE SWALLOW EVALUATION  Patient: Yoseph Olmedo (80 y.o. female)  Date: 2/23/2017  Primary Diagnosis: fatigue  UTI (urinary tract infection)        Precautions: Aspiration  Contact      ASSESSMENT :  Based on the objective data described below, the patient presents with moderate oropharyngeal dysphagia characterized by oral motor weakness, pharyngeal swallow delay and reduced laryngeal elevation via palpation. Dysphagia results in slow mastication and oral transit. Patient with inconsistent congested cough after all consistency of PO trials. Patient noted with congested cough prior to and after PO trials as well. Patient at risk for aspiration given dysphagia. Unable to determine aspiration at bedside given inconsistent coughing with all PO however unsure if patient / family would want MBS for further evaluation of swallow physiology. MBS would assist in determining aspiration and risk of aspiration with PO. Given bedside presentation recommend continue current diet with aspiration precautions. Patient will benefit from skilled intervention to address the above impairments.   Patients rehabilitation potential is considered to be Fair  Factors which may influence rehabilitation potential include:   [ ]            None noted  [X]            Mental ability/status  [X]            Medical condition  [X]            Home/family situation and support systems  [ ]            Safety awareness  [ ]            Pain tolerance/management  [ ]            Other:        PLAN :  Recommendations and Planned Interventions:  Continue current diet (mechanical soft, thin liquids)  Aspiration precautions:  Sit up for all PO, small bites, single sips  Frequency/Duration: Patient will be followed by speech-language pathology 3 times a week to address goals. Discharge Recommendations: To Be Determined       SUBJECTIVE:   Patient stated Can I have some more water please? . Patient agreed to treatment and endorsed poor appetite. OBJECTIVE:       Past Medical History:   Diagnosis Date    Abnormal chest CT 9/2012     RUL spiculated opacity     Aortic aneurysm ([facility] [address] 8/3/2014     Aortic arch aneurysm with partial thrombosis seen on chest CT 8/2/2014.   MPOA granddaughter Heidi Zafar aware, decline further evaluation except blood pressure control given age    Girish Melody Arthritis      COPD (chronic obstructive pulmonary disease) ([facility] [address]       on chest CT    DEMENTIA      Do not resuscitate 8/3/2014    Dysphagia due to old stroke 9/2012     family declined tube feed    Essential tremor      GERD (gastroesophageal reflux disease)      HTN (hypertension)      Hx of completed stroke      Hypercholesteremia      Liver mass 8/3/2014    Macular degeneration       Past Surgical History:   Procedure Laterality Date    HX BREAST BIOPSY Bilateral      HX CAROTID ENDARTERECTOMY Left      HX CATARACT REMOVAL Bilateral      HX COLOSTOMY        HX COLOSTOMY TAKE DOWN        HX HYSTERECTOMY        HX ORTHOPAEDIC         Right Shoulder surgery, Left wrist and Right elbow repair from Fractures     Prior Level of Function/Home Situation:   Home Situation  Home Environment: Assisted living   [facility] Name: [facility]  # Steps to Enter: 0  One/Two Story Residence: One story  Living Alone: No  Support Systems: Family member(s)  Patient Expects to be Discharged to[de-identified] Assisted living  Current DME Used/Available at Home: Zacariasla Liz, rolling  Tub or Shower Type: Shower  Diet prior to admission: Unknown  Current Diet:  Mechanically altered, thin liquids   Cognitive and Communication Status:  Neurologic State: Alert, Drowsy  Orientation Level: Oriented to person (oriented to year)              Oral Assessment:  Oral Assessment  Labial: Decreased seal  Dentition: Limited  Oral Hygiene: Dry oral mucosa. Tongue is red with white coating. Lingual: Decreased rate;Decreased strength  Velum: No impairment  Mandible: No impairment  P.O. Trials:  Patient Position: Upright in bed. Vocal quality prior to P.O.: Low volume;Hoarse  Consistency Presented: Ice chips; Thin liquid; Nectar thick liquid;Puree;Mechanical soft  How Presented: SLP-fed/presented;Spoon;Straw     Bolus Acceptance: No impairment  Bolus Formation/Control: Impaired  Type of Impairment: Delayed;Mastication  Propulsion: Delayed (# of seconds)  Oral Residue: None  Initiation of Swallow: Delayed (# of seconds)  Laryngeal Elevation: Weak  Aspiration Signs/Symptoms: Delayed cough/throat clear;Strong cough                 Oral Phase Severity: Moderate  Pharyngeal Phase Severity : Moderate     NOMS:   The NOMS functional outcome measure was used to quantify this patient's level of swallowing impairment. Based on the NOMS, the patient was determined to be at level 5 for swallow function      G Codes: In compliance with CMSs Claims Based Outcome Reporting, the following G-code set was chosen for this patient based the use of the NOMS functional outcome to quantify this patient's level of swallowing impairment. Using the NOMS, the patient was determined to be at level 5 for swallow function which correlates with the CJ= 20-39% level of severity.      Based on the objective assessment provided within this note, the current, goal, and discharge g-codes are as follows:     Swallow  Swallowing:   Swallow Current Status CJ= 20-39%   Swallow Goal Status CI= 1-19%         NOMS Swallowing Levels:  Level 1 (CN): NPO  Level 2 (CM): NPO but takes consistency in therapy  Level 3 (CL): Takes less than 50% of nutrition p.o. and continues with nonoral feedings; and/or safe with mod cues; and/or max diet restriction  Level 4 (CK): Safe swallow but needs mod cues; and/or mod diet restriction; and/or still requires some nonoral feeding/supplements  Level 5 (CJ): Safe swallow with min diet restriction; and/or needs min cues  Level 6 (CI): Independent with p.o.; rare cues; usually self cues; may need to avoid some foods or needs extra time  Level 7 ([address] [place] [address]): Independent for all p.o.  DB. (2003). National Outcomes Measurement System (NOMS): Adult Speech-Language Pathology User's Guide. Pain:  Pain Scale 1: Adult Nonverbal Pain Scale  Pain Intensity 1: 0     After treatment:   [ ]            Patient left in no apparent distress sitting up in chair  [X]            Patient left in no apparent distress in bed  [X]            Call bell left within reach  [X]            Nursing notified  [ ]            Caregiver present  [ ]            Bed alarm activated      COMMUNICATION/EDUCATION:   The patients plan of care including recommendations, planned interventions, and recommended diet changes were discussed with: Physical Therapist and Registered Nurse. [ ]            Posted safety precautions in patient's room. [X]            Patient/family have participated as able in goal setting and plan of care. [X]            Patient/family agree to work toward stated goals and plan of care. [ ]            Patient understands intent and goals of therapy, but is neutral about his/her participation. [ ]            Patient is unable to participate in goal setting and plan of care.      Thank you for this referral.  Keli Sauer SLP  Time Calculation: 25 mins

## 2017-02-23 NOTE — CDMP QUERY
Account Number: [de-identified]  MRN: [id number]  Patient: Jc Pablo  Created: [id number]  Clinician Name: Latrice Courtney RN, CCDS   SHANTHI Velazquez :  Please clarify if this patient is being treated/managed for:    => Probable Lung Ca  POA in the setting of Abnormal chest CT w. RUL spiculated opacity, FTT, weight loss, fatigue   =>Other Explanation of clinical findings  =>Unable to Determine (no explanation of clinical findings)    The medical record reflects the following clinical findings, treatment, and risk factors:    Risk Factors: FTT, advanced age, COPD, weight loss  Clinical Indicators: abnormal chest CT  suspect d/t lung or other cancer no w/u per family request  Treatment: hospice, comfort care, RD consult. Please clarify and document your clinical opinion in the progress notes and discharge summary including the definitive and/or presumptive diagnosis, (suspected or probable), related to the above clinical findings. Please include clinical findings supporting your diagnosis.     Thank You, Latrice Coutrney, [address] [place] [address]   [phone]

## 2017-02-23 NOTE — ROUTINE PROCESS
Oncology Nursing Communication Tool      9:08 AM  2/23/2017     Bedside and Verbal shift change report given to Haydee Flores RN (incoming nurse) by Kirstie Ramirez RN (outgoing nurse) on Vasyl Abo a 80 y.o. female who was admitted on 2/20/2017  2:35 PM. Report included the following information SBAR, Kardex, [facility], Recent Results and Med Rec Status. Opportunity for questions and clarifications were given to the incoming nurse. Patient's bed is in low position, side rails x2, door open PRN, call bell within reach and patient not in distress.       Kirstie Ramirez RN

## 2017-02-23 NOTE — HOSPICE
[facility] Note: Met with patient and daughter, Deisy Bishop and [address]  to discuss plan for patient and goals of care. Patient sitting in chair with breakfast tray next to her. Deisy Bishop indicated that patient at [facility] two small bites of her peach\", patient appetite is reduced per daughter. When asked how patient was doing, Deisy Bishop noted Renee Arthur is zombie like, much different from before when she was more interactive and talking some\". Patient came in with fatigue and \"body aches all over\", patient has lost about 20 lbs in the past three months and was diagnosed with a \"pulmonary mass that is suspicious for cancer\" per chart. Deisy Bishop is interested in hospice services for patient but would also like to try and see if patient can benefit from rehab to enable her to be more mobile. We discussed that if that is possible, the family and patient can try that and then choose hospice once rehab ends. Unclear at this time if patient would be able to or is interested in participating in rehab. She also has some positive urine cultures which may need treatment. Awaiting MD plan. Deisy Bishop has hospice contact information and will call us when needed.

## 2017-02-23 NOTE — PROGRESS NOTES
Oncology Nursing Communication Tool      7:36 PM  2/23/2017     Bedside shift change report given to Rula RN (incoming nurse) by Adilson Baker RN (outgoing nurse) on Brenda Stephens a 80 y.o. female who was admitted on 2/20/2017  2:35 PM. Report included the following information SBAR, Kardex, Procedure Summary, Intake/Output, MAR, Accordion, Recent Results and Med Rec Status. Significant changes during shift: Speech consult today. Fluconazole added to medication regimen. Issues for physician to address: None            Code Status: DNR     Infections: ESBL     Allergies: Codeine and Sulfa (sulfonamide antibiotics)     Current diet: DIET DYSPHAGIA MECH ALTERED (NDD2)  DIET NUTRITIONAL SUPPLEMENTS Breakfast, Dinner; Ensure Complete  DIET NUTRITIONAL SUPPLEMENTS Lunch; Magic Cups       Pain Controlled [x] yes [] no   Bowel Movement [] yes [x] no   Last Bowel Movement (date)     ? Vital Signs:   Patient Vitals for the past 12 hrs:   Temp Pulse Resp BP SpO2   02/23/17 0642 97.7 °F (36.5 °C) 78 17 (!) 152/92 98 %   02/23/17 0149 - - - - 96 %      Intake & Output:   No intake or output data in the 24 hours ending 02/23/17 1109   Laboratory Results:   No results found for this or any previous visit (from the past 12 hour(s)). Opportunity for questions and clarifications were given to the incoming nurse. Patient's bed is in low position, side rails x2, door open PRN, call bell within reach and patient not in distress.       Adilson Baker, RN

## 2017-02-23 NOTE — PROGRESS NOTES
Hospitalist Progress Note    NAME: Paradise Lilly   :  1922   MRN:  [id number]       Interim Hospital Summary: 80 y.o. female whom presented on 2017 with      Assessment / Plan:  Acute encephalopathy- improved  - Patient lethargic, less responsive per daughter  -likely secondary to UTI/dehydration vs progression of suspected lung ca.  - patient oriented x 3 but easily confused. -monitor, supportive care as above. -Family has met with hospice. Plan to return to [facility] [place] for rehab as able, then pursue hospice care. ARF (acute renal failure) / Dehydration   -creat 1.31 POA due to poor PO intake. -Received IVF with improvement in creatinine. -DC IVF .  -Encourage PO intake.  -follow BMP.     E. Coli UTI  -UA concerning for UTI, only 1000 colonies but patient mentation greatly improved following start of IV ceftriaxone.  -hx of recurrent UTI, often resistant. Seen by urology in the past and told by daughter nothing further to do. -IV ceftriaxone day 3, will complete 5 day course abx, can transition to PO when discharged if needed. Oral thrush  -Start fluconazole day     Severe protein calorie malnutrition / weight loss / Anorexia / FTT (failure to thrive) in adult / Abnormal chest CT with RUL spiculated opacity -POA  -Likely secondary to lung cancer, family has refused further work up  -Diet as tolerated with supplements.  -Discontinue IVF  tolerating well   -Plan to return to [facility] [place] for rehab as able, then pursue hospice care. Hx of cva / Dysphagia S/P CVA / Dementia  -Supportive care.   -hold ASA if going towards comfort and hospice. -SLP have seen rec mechanical soft with thin liquids. Fatigue / Debility / Arthritis  -POA, multifactorial, DDx dehydration, aging, deconditioning, cancer, lost muscle mass, arthritis.    -Fall precautions,   -PT/OT eval - rec return to SNF    Hypokalemia -resolved  -replete, monitore    HTN  -stable  -Would stop testing or treating     COPD-Noted on chest CT, but not on meds. No wheezing. Requiring supplemental O2, wean as tolerated. CXR 2/22- unchanged. GERD  Macular degeneration  Hypercholesteremia   Ct chest 12/26/16- Chronic type B thoracic aortic aneurysm of the aortic arch, which has increased from 5.4 cm to 6.4 cm since 2014. Code status: DNR  Prophylaxis: Lovenox  Recommended Disposition: Likely back to [facility] for rehab followed by pursuit of hospice. Subjective:     Chief Complaint / Reason for Physician Visit  Patient awake and alert, asks to be sat up. Denies SOB currently and was able to come off oxygen for much of the day. Family in room updated. Discussed with RN events overnight. Review of Systems:  Symptom Y/N Comments  Symptom Y/N Comments   Fever/Chills n   Chest Pain n    Poor Appetite y   Edema n    Cough    Abdominal Pain n    Sputum    Joint Pain     SOB/HEARD n   Pruritis/Rash     Nausea/vomit n   Tolerating PT/OT     Diarrhea    Tolerating Diet     Constipation    Other       Could NOT obtain due to:      Objective:     VITALS:   Last 24hrs VS reviewed since prior progress note. Most recent are:  Patient Vitals for the past 24 hrs:   Temp Pulse Resp BP SpO2   02/23/17 1509 - - - - 97 %   02/23/17 1426 97.7 °F (36.5 °C) 90 18 (!) 144/95 98 %   02/23/17 0642 97.7 °F (36.5 °C) 78 17 (!) 152/92 98 %   02/23/17 0149 - - - - 96 %   02/22/17 2102 97.6 °F (36.4 °C) 85 16 (!) 159/98 96 %     No intake or output data in the 24 hours ending 02/23/17 1649     PHYSICAL EXAM:  General: Thin. Awake, alert, no acute distress  , 2l NC, hard of hearing. EENT:  Anicteric sclerae. EOMI, Dry MM, oral thrush  Resp:  CTAB, no wheezing. No accessory muscle use  CV:  Regular  rhythm,  No edema  GI:  Soft, Non distended, Non tender.  +Bowel sounds  Neurologic:  Aaox3, easily confused. Psych:   Poor insight, not agitated. Skin:  Scattered ecchymosis of bilateral LE.     Reviewed most current lab test results and cultures  YES  Reviewed most current radiology test results   YES  Review and summation of old records today    NO  Reviewed patient's current orders and MAR    YES  PMH/SH reviewed - no change compared to H&P  ________________________________________________________________________  Care Plan discussed with:    Comments   Patient y    Family  y daughter   RN y    Care Manager     Consultant                        Multidiciplinary team rounds were held today with , nursing, pharmacist and clinical coordinator. Patient's plan of care was discussed; medications were reviewed and discharge planning was addressed. ________________________________________________________________________  Total NON critical care TIME:  30   Minutes    Total CRITICAL CARE TIME Spent:   Minutes non procedure based      Comments   >50% of visit spent in counseling and coordination of care     ________________________________________________________________________  Les Mc MD     Procedures: see electronic medical records for all procedures/Xrays and details which were not copied into this note but were reviewed prior to creation of Plan. LABS:  I reviewed today's most current labs and imaging studies.   Pertinent labs include:  Recent Labs      02/21/17   0454   WBC  7.1   HGB  14.1   HCT  42.1   PLT  228     Recent Labs      02/22/17   0400  02/21/17   0454   NA  138  137   K  4.1  3.2*   CL  104  102   CO2  25  27   GLU  156*  177*   BUN  20  30*   CREA  0.77  1.05*   CA  8.3*  8.8   MG  2.2  2.3   PHOS   --   2.3*   ALB   --   2.8*   TBILI   --   0.7   SGOT   --   15   ALT   --   12       Signed: )Les Mc MD

## 2017-02-23 NOTE — PROGRESS NOTES
Problem: Mobility Impaired (Adult and Pediatric)  Goal: *Acute Goals and Plan of Care (Insert Text)  Physical Therapy Goals  Initiated 2/21/2017  1. Patient will move from supine to sit and sit to supine , scoot up and down and roll side to side in bed with supervision/set-up within 7 day(s). 2. Patient will transfer from bed to chair and chair to bed with supervision/set-up using the least restrictive device within 7 day(s). 3. Patient will perform sit to stand with supervision/set-up within 7 day(s). 4. Patient will ambulate with supervision/set-up for 50 feet with the least restrictive device within 7 day(s). PHYSICAL THERAPY TREATMENT  Patient: James Judge (80 y.o. female)  Date: 2/23/2017  Diagnosis: fatigue  UTI (urinary tract infection) UTI (urinary tract infection)       Precautions: Contact, [place]      ASSESSMENT:  Pt with improved activity tolerance this date however remains impaired overall as pt fatigues after limited distance ambulation trial. Pt received supine in bed, agreeable to participation with therapy. Pt required modA in order to assume seated position EOB. Remaining functional mobility occurred with minAx1 including sit<>stand transfers (from EOB and from commode) as well as ambulation trial w/ RW. Pt required Brendon during sit>>stand transfer secondary to posterior trunk lean/LOB and in order to facilitate forward weight shift. Once standing, pt ambulated 10ft x2 (Seated rest break on commode) w/ RW and minAx1. Pt exhibits posterior trunk lean with multiple minor posterior LOB, requiring Brendon to correct. Slow, shuffled gait pattern with kyphotic posture throughout. Mobility occurred on RA with O2 sats 94-95% at completion of ambulation trial/mobility. Pt with delayed initiation of motor movement throughout mobility, requiring increased VC for attention to task however noted that pt is also very [place]. Pending family wishes, recommend SNF upon discharge.      Progression toward goals:  [X] Improving appropriately and progressing toward goals  [ ]    Improving slowly and progressing toward goals  [ ]    Not making progress toward goals and plan of care will be adjusted       PLAN:  Patient continues to benefit from skilled intervention to address the above impairments. Continue treatment per established plan of care. Discharge Recommendations:  Skilled Nursing Facility  Further Equipment Recommendations for Discharge:  TBD by facility       SUBJECTIVE:   Patient stated Please don't leave me in here alone.       OBJECTIVE DATA SUMMARY:   Critical Behavior:  Neurologic State: Alert  Orientation Level: Oriented to person (oriented to year)        Functional Mobility Training:  Bed Mobility:     Supine to Sit: Moderate assistance; Additional time     Scooting: Minimum assistance        Transfers:  Sit to Stand: Minimum assistance                                Balance:  Sitting: Impaired  Sitting - Static: Fair (occasional)  Sitting - Dynamic: Fair (occasional)  Standing: Impaired  Standing - Static: Fair  Standing - Dynamic : Fair  Ambulation/Gait Training:  Distance (ft): 20 Feet (ft)  Assistive Device: Walker, rolling;Gait belt  Ambulation - Level of Assistance: Minimal assistance        Gait Abnormalities: Decreased step clearance;Shuffling gait (posterior trunk lean/LOB, kyphotc posture)        Base of Support: Widened     Speed/Flower: Pace decreased (<100 feet/min); Shuffled  Step Length: Left shortened;Right shortened                             Pt ambulated 10ft x2 (Seated rest break on commode) w/ RW and minAx1. Pt exhibits posterior trunk lean with multiple minor posterior LOB, requiring Brendon to correct. Slow, shuffled gait pattern with kyphotic posture     Pain:  Pain Scale 1: Adult Nonverbal Pain Scale  Pain Intensity 1: 0              Activity Tolerance:   O2 sats 94-95% on RA during mobility. Please refer to the flowsheet for vital signs taken during this treatment.   After treatment: [X]    Patient left in no apparent distress sitting up in chair  [ ]    Patient left in no apparent distress in bed  [X]    Call bell left within reach  [X]    Nursing notified  [ ]    Caregiver present  [ ]    Bed alarm activated      COMMUNICATION/COLLABORATION:   The patients plan of care was discussed with: Occupational Therapist and Registered Nurse     Tyler Ball, PT, DPT   Time Calculation: 29 mins

## 2017-02-24 NOTE — PROGRESS NOTES
Problem: Dysphagia (Adult)  Goal: *Acute Goals and Plan of Care (Insert Text)  Speech Therapy Goals  Initiated 2/23/2017  1. Patient will tolerate mechanically altered diet with thin liquids without signs/symptoms of aspiration within 7 day(s). SPEECH LANGUAGE PATHOLOGY DYSPHAGIA TREATMENT/DISCHARGE  Patient: Esvin Nelson (80 y.o. female)  Date: 2/24/2017  Diagnosis: fatigue  UTI (urinary tract infection) UTI (urinary tract infection)       Precautions:   Contact      ASSESSMENT:  Patient awake and alert, upright in chair. Daughter and son in law present. Patient continues with intermittent coughing/choking on thin liquids. Decreased intake for solids. Discussion with patient's daughter: daughter indicates patient has been choking on liquids ever since CVA several years ago. She was drinking thickened liquids for a time, but did not like them. At this point in patient's life, family wants her to continue with thin liquids, acknowledging risks of aspiration. They are aware she has had multiple pneumonias and that aspiration is likely related. Daughter reports that possible lung mass may be pushing on her esophagus, making intake difficult. Discussed providing liquid supplements (which RD is aware) as liquids will likely pass through a constriction better than solids. Daughter seems to have excellent understanding of all of this information. Nothing further for SLP to add. PLAN:  Diet as tolerated, per patient and family wishes  Patient will be discharged from speech therapy at this time. Rationale for discharge:  [ ]      Goals Achieved  [X]      Plateau Reached  [ ]      Patient not participating in therapy  [ ]      Other:  Discharge Recommendations:  None       SUBJECTIVE:   Patient stated [address]  .      OBJECTIVE:   Cognitive and Communication Status:  Neurologic State: Alert  Orientation Level: Oriented to person, Oriented to place, Oriented to time                    Dysphagia Treatment: P.O. Trials:     Trials of thins via straw with cough 50% of sips. After treatment:   [X]                Patient left in no apparent distress sitting up in chair  [ ]                Patient left in no apparent distress in bed  [X]                Call bell left within reach  [ ]                Nursing notified  [X]                Caregiver present  [ ]                Bed alarm activated      COMMUNICATION/EDUCATION:   Patient was educated regarding swallow history, options for diet modification, aspiration risk     The patients plan of care including recommendations, planned interventions, and recommended diet changes were discussed with: Registered Nurse. [ ]                Posted safety precautions in patient's room.      Kerline Murphy SLP  Time Calculation: 12 mins

## 2017-02-24 NOTE — PROGRESS NOTES
Referral sent via [facility] to [facility] as pt may be ready to return today or tomorrow. Awaiting response from [facility].     Ken Suazo, [address] [place] [address]

## 2017-02-24 NOTE — PROGRESS NOTES
Hospitalist Progress Note    NAME: Haydee Carson   :  1922   MRN:  [id number]       Interim Hospital Summary: 80 y.o. female whom presented on 2017 with      Assessment / Plan:  Acute encephalopathy- improved  -likely secondary to UTI/dehydration vs progression of suspected lung ca. -now AAOx3 but remains lethargic  -monitor, supportive care as above. -Family has met with hospice. Plan to return to [facility] on [place] for rehab as able, then pursue hospice care. ARF (acute renal failure) / Dehydration - resolved  -creat 1.31 POA due to poor PO intake. -Received IVF with improvement in creatinine. -DC IVF .  -Encourage PO intake.  -follow BMP.     E. Coli UTI  -UA concerning for UTI, only 1000 colonies but patient mentation greatly improved following start of IV ceftriaxone.  -hx of recurrent UTI, often resistant. Seen by urology in the past and told by daughter nothing further to do. -IV ceftriaxone day 4, will complete 5 day course abx, can transition to PO when discharged if needed. Oral thrush  -Start fluconazole day     Severe protein calorie malnutrition / weight loss / Anorexia / FTT (failure to thrive) in adult / Abnormal chest CT with RUL spiculated opacity -POA  -Likely secondary to lung cancer, family has refused further work up  -Diet as tolerated with supplements.  -Discontinued IVF  tolerating well   -Plan to return to [facility] [place] for rehab as able, then pursue hospice care. Hx of cva / Dysphagia S/P CVA / Dementia  -Supportive care.   -resume ASA  -SLP have seen rec mechanical soft with thin liquids. Fatigue / Debility / Arthritis  -POA, multifactorial, DDx dehydration, aging, deconditioning, cancer, lost muscle mass, arthritis. -Fall precautions,   -PT/OT eval - rec return to SNF    Hypokalemia -resolved  -replete, monitore    HTN  -stable  -Would stop testing or treating     COPD-Noted on chest CT, but not on meds. No wheezing.  CXR - unchanged.  -weaned off supplemental O2 today. Constipation  -start miralax and pericolace      GERD  Macular degeneration  Hypercholesteremia   Ct chest 12/26/16- Chronic type B thoracic aortic aneurysm of the aortic arch, which has increased from 5.4 cm to 6.4 cm since 2014. Code status: DNR  Prophylaxis: Lovenox  Recommended Disposition: Likely back to [facility] care for rehab followed by pursuit of hospice. Subjective:     Chief Complaint / Reason for Physician Visit  Patient reports pain all over her body, feeling more lethargic. Sob has improved and off O2. She also reports no BM since admission. Daughter updated over the phone. Discussed with RN events overnight. Review of Systems:  Symptom Y/N Comments  Symptom Y/N Comments   Fever/Chills n   Chest Pain n    Poor Appetite y   Edema n    Cough    Abdominal Pain n    Sputum    Joint Pain     SOB/HEARD n   Pruritis/Rash     Nausea/vomit n   Tolerating PT/OT     Diarrhea    Tolerating Diet     Constipation y   Other       Could NOT obtain due to:      Objective:     VITALS:   Last 24hrs VS reviewed since prior progress note. Most recent are:  Patient Vitals for the past 24 hrs:   Temp Pulse Resp BP SpO2   02/24/17 1430 97.9 °F (36.6 °C) 96 18 119/78 96 %   02/24/17 1200 - (!) 101 - (!) 143/96 -   02/24/17 0540 97.5 °F (36.4 °C) 89 18 (!) 158/92 96 %   02/23/17 2057 98.1 °F (36.7 °C) 93 18 134/85 93 %     No intake or output data in the 24 hours ending 02/24/17 1626     PHYSICAL EXAM:  General: Thin. Awake, alert, no acute distress  ,  hard of hearing. EENT:  Anicteric sclerae. EOMI, Dry MM, oral thrush  Resp:  CTAB, no wheezing. No accessory muscle use  CV:  Regular  rhythm,  No edema  GI:  Soft, Non distended, Non tender.  +Bowel sounds  Neurologic:  Aaox3, easily confused. Psych:   Poor insight, not agitated. Skin:  Scattered ecchymosis of bilateral LE.     Reviewed most current lab test results and cultures  YES  Reviewed most current radiology test results   YES  Review and summation of old records today    NO  Reviewed patient's current orders and MAR    YES  PMH/SH reviewed - no change compared to H&P  ________________________________________________________________________  Care Plan discussed with:    Comments   Patient y    Family  y daughter   RN y    Care Manager y    Consultant                        Multidiciplinary team rounds were held today with , nursing, pharmacist and clinical coordinator. Patient's plan of care was discussed; medications were reviewed and discharge planning was addressed. ________________________________________________________________________  Total NON critical care TIME:  30   Minutes    Total CRITICAL CARE TIME Spent:   Minutes non procedure based      Comments   >50% of visit spent in counseling and coordination of care     ________________________________________________________________________  Corinne Kitchen MD     Procedures: see electronic medical records for all procedures/Xrays and details which were not copied into this note but were reviewed prior to creation of Plan. LABS:  I reviewed today's most current labs and imaging studies.   Pertinent labs include:  Recent Labs      02/23/17   0555   WBC  10.8   HGB  14.4   HCT  44.2   PLT  239     Recent Labs      02/23/17   0555  02/22/17   0400   NA  137  138   K  3.9  4.1   CL  102  104   CO2  23  25   GLU  94  156*   BUN  16  20   CREA  0.66  0.77   CA  8.6  8.3*   MG   --   2.2       Signed: )Corinne Kitchen MD

## 2017-02-24 NOTE — PROGRESS NOTES
Nutrition Assessment:    INTERVENTIONS/RECOMMENDATIONS:   Continue current diet  Ensure BID and Magic Cup    ASSESSMENT:   Chart reviewed, medically noted for malnutrition, fatigue, body aches, FTT, lung mass and UTI. Noted for plan of rehab followed by pursue of hospice care after discharge. Pt had very little consumed off breakfast tray. Daughter reports that she has not been receiving ordered Ensure. Looked into and fixed. Diet Order:  (Dysphagia mechanically altered (NDD2))  % Eaten:  Patient Vitals for the past 72 hrs:   % Diet Eaten   02/22/17 0938 15 %     Pertinent Medications: [x] Reviewed []Other: (miralax, senna-docusate)  Pertinent Labs: [x]Reviewed  []Other:  Food Allergies: [x]None []Other:     Last BM: 2/16   []Active     []Hyperactive  [x]Hypoactive       [] Absent  BS  Skin:    [] Intact   [] Incision  [x] Breakdown   []Edema   []Other:    Anthropometrics: Height: 5' 6\" (167.6 cm) Weight: 88.7 kg (195 lb 8.8 oz)    IBW (%IBW):   ( ) UBW (%UBW):   (  %)    BMI: Body mass index is 31.56 kg/(m^2). This BMI is indicative of:  []Underweight   []Normal   []Overweight   [x] Obesity   [] Extreme Obesity (BMI>40)  Last Weight Metrics:  Weight Loss Metrics 2/24/2017 12/26/2016 7/17/2016 12/1/2015 10/22/2015 9/28/2015 9/28/2015   Today's Wt 195 lb 8.8 oz 108 lb 14.5 oz 109 lb 112 lb 7 oz 117 lb 117 lb 12.8 oz 119 lb   BMI 31.56 kg/m2 17.58 kg/m2 18.14 kg/m2 18.71 kg/m2 19.47 kg/m2 19.6 kg/m2 19.8 kg/m2       Estimated Nutrition Needs (Based on): 1338 Kcals/day (BMR (837) x 1.3AF +250kcal) , 50 g (1.2 g/kg bw) Protein  Carbohydrate:  At Least 130 g/day  Fluids: 1338 mL/day    NUTRITION DIAGNOSES:   Problem:  Underweight      Etiology: related to dementia, FTT, anorexia     Signs/Symptoms: as evidenced by BMI 14.9, PO <50% of meals       NUTRITION INTERVENTIONS:  Meals/Snacks: General/healthful diet   Supplements: Vitamin (specify)              GOAL:   consume >50% of meals and ONS in 3-5 days    NUTRITION MONITORING AND EVALUATION      Food/Nutrient Intake Outcomes:  Total energy intake  Physical Signs/Symptoms Outcomes: Weight/weight change, Electrolyte and renal profile    Previous Goal Met:   [] Met              [x] Progressing Towards Goal              [] Not Progressing Towards Goal   Previous Recommendations:   [x] Implemented          [] Not Implemented          [] Not Applicable    LEARNING NEEDS (Diet, Food/Nutrient-Drug Interaction):    [x] None Identified   [] Identified and Education Provided/Documented   [] Identified and Pt declined/was not appropriate     Cultural, Methodist, OR Ethnic Dietary Needs:    [x] None Identified   [] Identified and Addressed     [x] Interdisciplinary Care Plan Reviewed/Documented    [x] Discharge Planning: General healthy diet with ONS PRN   [] Participated in Interdisciplinary Rounds    NUTRITION RISK:    [] High              [x] Moderate           []  Low  []  Minimal/Uncompromised      Ladarius Mcdaniel, RDN  Pager [phone]  Weekend Pager [phone]

## 2017-02-24 NOTE — PROGRESS NOTES
Problem: Mobility Impaired (Adult and Pediatric)  Goal: *Acute Goals and Plan of Care (Insert Text)  Physical Therapy Goals  Initiated 2/21/2017  1. Patient will move from supine to sit and sit to supine , scoot up and down and roll side to side in bed with supervision/set-up within 7 day(s). 2. Patient will transfer from bed to chair and chair to bed with supervision/set-up using the least restrictive device within 7 day(s). 3. Patient will perform sit to stand with supervision/set-up within 7 day(s). 4. Patient will ambulate with supervision/set-up for 50 feet with the least restrictive device within 7 day(s). PHYSICAL THERAPY TREATMENT  Patient: Eduardo Sanchez (80 y.o. female)  Date: 2/24/2017  Diagnosis: fatigue  UTI (urinary tract infection) UTI (urinary tract infection)       Precautions: Contact      ASSESSMENT:  Pt received supine in bed and agreeable to participation with therapy with increased encouragement. Pt continues to require minAx1 throughout all aspects of mobility including bed mobility, sit<>stand transfers (from EOB and from commode) and ambulation w/ use of RW. Pt able to progress ambulation distance this date to 45ft before fatigue. Gait stability remains fair overall with slow, shuffled gait pattern (minimal foot clearance) and pt exhibiting kyphotic posture. Previously seen posterior trunk lean/LOB/weight shift not seen during sit<>stand transfers or ambulation this date. Pt did require increased VC for redirection to task/to remain on task. O2 sats 98% on RA throughout mobility w/ HR elevated to 108 BPM. Pt would continue to benefit from skilled intervention to address increased generalized weakness, decreased endurance/activity tolerance, impaired gait mechanics, and dynamic balance deficits. Recommend SNF upon discharge, pending family wishes.       Progression toward goals:  [ ]    Improving appropriately and progressing toward goals  [X]    Improving slowly and progressing toward goals  [ ]    Not making progress toward goals and plan of care will be adjusted       PLAN:  Patient continues to benefit from skilled intervention to address the above impairments. Continue treatment per established plan of care. Discharge Recommendations:  Leonidas Lyn  Further Equipment Recommendations for Discharge:  TBD by facility       SUBJECTIVE:   Patient stated You're holding on to me, right? Alireza Wilkerson      OBJECTIVE DATA SUMMARY:   Critical Behavior:  Neurologic State: Alert  Orientation Level: Oriented to person, Oriented to place, Oriented to time        Functional Mobility Training:  Bed Mobility:  Rolling: Minimum assistance  Supine to Sit: Minimum assistance     Scooting: Minimum assistance        Transfers:  Sit to Stand: Minimum assistance  Stand to Sit: Minimum assistance        Bed to Chair: Minimum assistance                    Balance:  Sitting: Impaired  Sitting - Static: Good (unsupported)  Sitting - Dynamic: Fair (occasional)  Standing: Impaired; With support  Standing - Static: Fair;Constant support  Standing - Dynamic : Fair  Ambulation/Gait Training:  Distance (ft): 45 Feet (ft)  Assistive Device: Walker, rolling;Gait belt  Ambulation - Level of Assistance: Minimal assistance        Gait Abnormalities: Decreased step clearance;Shuffling gait (kyphotic posture)        Base of Support: Widened     Speed/Flower: Pace decreased (<100 feet/min); Shuffled  Step Length: Left shortened;Right shortened                             Pt ambulated 45ft w/ RW and Brendon, exhibiting slow, shuffled gait with kyphotic posture and increased VC to remain on task/redirection to task. Pain:  Pain Scale 1: Numeric (0 - 10)  Pain Intensity 1: 0              Activity Tolerance:   O2 sats 98% on RA during mobility with HR elevated to 108 BPM  Please refer to the flowsheet for vital signs taken during this treatment.   After treatment:   [X]    Patient left in no apparent distress sitting up in chair  [ ] Patient left in no apparent distress in bed  [X]    Call bell left within reach  [X]    Nursing notified  [ ]    Caregiver present  [ ]    Bed alarm activated      COMMUNICATION/COLLABORATION:   The patients plan of care was discussed with: Registered Nurse     Laura Beckman PT, DPT   Time Calculation: 21 mins

## 2017-02-24 NOTE — PROGRESS NOTES
Problem: Self Care Deficits Care Plan (Adult)  Goal: *Acute Goals and Plan of Care (Insert Text)  Occupational Therapy Goals  Initiated 2/21/2017  1. Patient will perform self-feeding in supported sitting with supervision/set-up within 7 day(s). 2. Patient will perform upper body dressing while sitting EOB with supervision/set-up within 7 day(s). 3. Patient will perform grooming while sitting EOB with contact guard assist within 7 day(s). 4. Patient will perform toilet transfers with minimal assistance at RW level within 7 day(s). OCCUPATIONAL THERAPY TREATMENT  Patient: Ambrosio Valdez (80 y.o. female)  Date: 2/24/2017  Diagnosis: fatigue  UTI (urinary tract infection) UTI (urinary tract infection)       Precautions: Contact      ASSESSMENT:  Patient in chair on arrival and agreeable to stand to change position due to sacral pain, however stood briefly and declined to perform again. Flat affect, when asked how she feels says \"terrible\" on arrival and in standing. Unsure if patient up for participating in rehab. Family feeding her as she is requesting assist and at this time wanting to be cared for. Progression toward goals:  [ ]       Improving appropriately and progressing toward goals  [X]       Improving slowly and progressing toward goals  [ ]       Not making progress toward goals and plan of care will be adjusted       PLAN:  Patient continues to benefit from skilled intervention to address the above impairments. Continue treatment per established plan of care. Discharge Recommendations:  Leonidas Lyn  Further Equipment Recommendations for Discharge:         SUBJECTIVE:   Patient stated Terrible.  when asked how she felt on arrival and in standing      OBJECTIVE DATA SUMMARY:   Cognitive/Behavioral Status:  Neurologic State: Alert   Functional Mobility and Transfers for ADLs:  Bed Mobility:  Rolling: Minimum assistance  Supine to Sit: Minimum assistance  Scooting: Minimum assistance Transfers:  Sit to Stand: Minimum assistance; Additional time; Adaptive equipment;Assist x1  Stand to Sit: Minimum assistance  Bed to Chair: Minimum assistance     Balance:  Sitting: Impaired  Sitting - Static: Good (unsupported)  Sitting - Dynamic: Fair (occasional)  Standing: Impaired; With support  Standing - Static: Fair;Constant support  Standing - Dynamic : Fair     ADL Intervention:  Feeding  Feeding Assistance: Total assistance (dependent) (requesting assist)  Food to Mouth: Total assistance (dependent)  Drink to Mouth: Total assistance (dependent)      educated patient and family on need for pressure relief due to patient complaint of pain and sitting in chair extended time, instructed on sitting upright and lateral shift left and right, call for RN to stand           Therapeutic Exercises:   Instructed to perform bilateral ankle pumps throughout day  Pain:      family informed patient complaint of sacral pain              Activity Tolerance:   Poor   Please refer to the flowsheet for vital signs taken during this treatment.   After treatment:   [X] Patient left in no apparent distress sitting up in chair  [ ] Patient left in no apparent distress in bed  [X] Call bell left within reach  [X] Nursing notified  [X] Caregiver present  [ ] Bed alarm activated      COMMUNICATION/COLLABORATION:   The patients plan of care was discussed with: Physical Therapist and Registered Nurse     Barbara Kelly OTR/L  Time Calculation: 14 mins

## 2017-02-25 NOTE — PROGRESS NOTES
Oncology Nursing Communication Tool      9:05 AM  2/25/2017     Bedside and Verbal shift change report given to JOSE Mazariegos (incoming nurse) by Robby Garibay RN (outgoing nurse) on Sreekanth Thao a 80 y.o. female who was admitted on 2/20/2017  2:35 PM. Report included the following information SBAR, ED Summary, OR Summary and Med Rec Status. Significant changes during shift: none      Issues for physician to address:            Code Status: DNR     Infections: ESBL     Allergies: Codeine and Sulfa (sulfonamide antibiotics)     Current diet:        Pain Controlled [x] yes [] no   Bowel Movement [x] yes [] no   Last Bowel Movement (date) 2/25/2017            Vital Signs:   Patient Vitals for the past 12 hrs:   Temp Pulse Resp BP SpO2   02/25/17 0630 97.8 °F (36.6 °C) 89 16 (!) 137/93 95 %   02/24/17 2313 - 89 - (!) 121/92 96 %      Intake & Output:     Intake/Output Summary (Last 24 hours) at 02/25/17 0905  Last data filed at 02/24/17 1828   Gross per 24 hour   Intake              240 ml   Output                0 ml   Net              240 ml      Laboratory Results:   No results found for this or any previous visit (from the past 12 hour(s)). Opportunity for questions and clarifications were given to the incoming nurse. Patient's bed is in low position, side rails x2, door open PRN, call bell within reach and patient not in distress.       Robby Garibay RN

## 2017-02-25 NOTE — DISCHARGE SUMMARY
Hospitalist Discharge Summary     Patient ID:  Ambrosio Valdez  [id number]  13 y.o.  12/6/1922    PCP on record: Hermelinda Gómez MD    Admit date: 2/20/2017  Discharge date and time: 2/25/2017      DISCHARGE DIAGNOSIS:    Acute encephalopathy  ARF (acute renal failure) / Dehydration  E. Coli UTI  Oral thrush  Severe protein calorie malnutrition / weight loss / Anorexia / FTT (failure to thrive) in adult / Abnormal chest CT with RUL spiculated opacity -POA  Hx of cva / Dysphagia S/P CVA / Dementia  Fatigue / Debility / Arthritis  Hypokalemia -resolved  HTN  COPD  Constipation  GERD  Macular degeneration  Hypercholesteremia   Ct chest 12/26/16- Chronic type B thoracic aortic aneurysm of the aortic arch, which has increased from 5.4 cm to 6.4 cm since 2014. CONSULTATIONS:  None    Excerpted HPI from H&P of Meg Arias MD:  Ms. Jh Dunn is a 80 y.o.  female who presented to the Emergency Department complaining of fatigue. Worsening over weeks. Recent treatment of presumed PNA and UTI, with completed course of Levaquin and Bactrim. Recent finding of lung mass, with family decision to do no workup. Baseline weak with walker, and now worse, no ambulation. Mild non-productive cough. Poor appetite and significant weight loss in last months. ER workup show dehydration, but no other significant findings. We will admit her for management.    ______________________________________________________________________  DISCHARGE SUMMARY/HOSPITAL COURSE:  for full details see H&P, daily progress notes, labs, consult notes. Acute encephalopathy- improved  -likely secondary to UTI/dehydration vs progression of suspected lung ca. -AAOx3 following abx and IVF. -monitor, supportive care as above. -Family has met with hospice. Plan to return to [facility] on [place] for rehab as able, then pursue hospice care.     ARF (acute renal failure) / Dehydration - resolved  -creat 1.31 POA due to poor PO intake. -Received IVF with improvement in creatinine. -DC IVF 2/22.  -Encourage PO intake.      E. Coli UTI  -UA concerning for UTI, only 1000 colonies but patient mentation greatly improved following start of IV ceftriaxone.  -hx of recurrent UTI, often resistant. Seen by urology in the past and told by daughter nothing further to do. -completed 5 days ceftriaxone     Oral thrush  -7 days fluconazole to complete on DC.     Severe protein calorie malnutrition / weight loss / Anorexia / FTT (failure to thrive) in adult / Abnormal chest CT with RUL spiculated opacity -POA  -Likely secondary to lung cancer, family has refused further work up  -NDD2 with supplements.  -Discontinued IVF 2/22 tolerating well   -Plan to return to [facility] on DC for rehab as able, then pursue hospice care.     Hx of cva / Dysphagia S/P CVA / Dementia  -Supportive care.   -resume ASA  -SLP have seen rec mechanical soft with thin liquids.     Fatigue / Debility / Arthritis  -POA, multifactorial, DDx dehydration, aging, deconditioning, cancer, lost muscle mass, arthritis. -Fall precautions,   -PT/OT eval - rec return to SNF     Hypokalemia -resolved  -replete, monitor     HTN  -resume pta amlodipine 2.5mg, stop labetalol.       COPD-Noted on chest CT, but not on meds. No wheezing. CXR 2/22- unchanged.  -weaned off supplemental O2 2/23.     Constipation  -continue bowel regimen     _______________________________________________________________________  Patient seen and examined by me on discharge day. Pertinent Findings:  Gen:    Not in distress  Chest: Clear lungs  CVS:   Regular rhythm. No edema  Abd:  Soft, not distended, not tender  Neuro:  Alert, oriented x 3  _______________________________________________________________________  DISCHARGE MEDICATIONS:   Current Discharge Medication List      START taking these medications    Details   fluconazole (DIFLUCAN) 100 mg tablet Take 1 Tab by mouth daily for 5 doses.  FDA advises cautious prescribing of oral fluconazole in pregnancy. Qty: 5 Tab, Refills: 0         CONTINUE these medications which have NOT CHANGED    Details   amLODIPine (NORVASC) 2.5 mg tablet Take 1 Tab by mouth daily. Qty: 20 Tab, Refills: 0      polyethylene glycol (MIRALAX) 17 gram/dose powder Take 17 g by mouth daily. 1 tablespoon with 8 oz of water daily  Qty: 510 g, Refills: 0      aspirin 81 mg chewable tablet Take 81 mg by mouth daily. bisacodyl (DULCOLAX, BISACODYL,) 5 mg EC tablet Take 5-10 mg by mouth nightly as needed for Constipation. STOP taking these medications       labetalol (NORMODYNE) 100 mg tablet Comments:   Reason for Stopping:               My Recommended Diet, Activity, Wound Care, and follow-up labs are listed in the patient's Discharge Insturctions which I have personally completed and reviewed.     _______________________________________________________________________  DISPOSITION:    Home with Family:    Home with HH/PT/OT/RN:    SNF/LTC: x   MINERVA:    OTHER:        Condition at Discharge:  Stable  _______________________________________________________________________  Follow up with:   PCP : Gaby Garcia MD  Follow-up Information     Follow up With Details [address] , [place]   [address]  [address] [phone]                Total time in minutes spent coordinating this discharge (includes going over instructions, follow-up, prescriptions, and preparing report for sign off to her PCP) :  45 minutes    Signed:  Adolfo Latham MD

## 2017-02-25 NOTE — PROGRESS NOTES
1005 Received discharge orders. Left message with case management regarding arrangements to return back to [address] [place] [address],[address]. [facility] with Megan Mckeon, case management, Critical access hospital[facility],[address] has accepted patient. She will arrange transport and call this nurse back with time. [address] report to Geraldine Cortez. Answered all questions. Patient being transported back to facility with prescription for diflucan. Transport scheduled to pick patient up at 1330.     1316 Removed IV.     1337 Transport here to  patient.

## 2017-02-25 NOTE — DISCHARGE INSTRUCTIONS
HOSPITALIST DISCHARGE INSTRUCTIONS    NAME: Chasity Silva   :  1922   MRN:  [id number]     Date/Time:  2017 9:12 AM    ADMIT DATE: 2017   DISCHARGE DATE: 2017     Attending Physician: Sin Solano MD    DISCHARGE DIAGNOSIS:    Acute encephalopathy  ARF (acute renal failure) / Dehydration  E. Coli UTI  Oral thrush  Severe protein calorie malnutrition / weight loss / Anorexia / FTT (failure to thrive) in adult / Abnormal chest CT with RUL spiculated opacity -POA  Hx of cva / Dysphagia S/P CVA / Dementia  Fatigue / Debility / Arthritis  Hypokalemia -resolved  HTN  COPD  Constipation  GERD  Macular degeneration  Hypercholesteremia   Ct chest 16- Chronic type B thoracic aortic aneurysm of the aortic arch, which has increased from 5.4 cm to 6.4 cm since . Medications: Per above medication reconciliation. Pain Management: per above medications    Recommended diet: NDD2, ensure complete breakfast and dinner, magic cup with lunch    Recommended activity: PT/OT Eval and Treat    Wound care: None    Indwelling devices:  None    Supplemental Oxygen: None    Required Lab work: Per SNF routine    Glucose management:  None    Code status: DNR        Outside physician follow up: Follow-up Information     None               Skilled nursing facility/ SNF MD responsible for above on discharge. Information obtained by :  I understand that if any problems occur once I am at home I am to contact my physician. I understand and acknowledge receipt of the instructions indicated above.                                                                                                                                            Physician's or R.N.'s Signature                                                                  Date/Time Patient or Repres

## 2017-02-25 NOTE — PROGRESS NOTES
Oncology Nursing Communication Tool      7:46 PM  2/24/2017     Bedside shift change report given to Yaritza Francois RN (incoming nurse) by Eugena Lennox, RN (outgoing nurse) on Roseann Bias a 80 y.o. female who was admitted on 2/20/2017  2:35 PM. Report included the following information SBAR and Kardex. Significant changes during shift: discharge tomorrow? Issues for physician to address: none            Code Status: DNR     Infections: ESBL     Allergies: Codeine and Sulfa (sulfonamide antibiotics)     Current diet: DIET DYSPHAGIA MECH ALTERED (NDD2)  DIET NUTRITIONAL SUPPLEMENTS Breakfast, Dinner; Ensure Complete  DIET NUTRITIONAL SUPPLEMENTS Lunch; Magic Cups  DIET ONE TIME MESSAGE       Pain Controlled [x] yes [] no   Bowel Movement [] yes [x] no   Last Bowel Movement (date) ? Vital Signs:   Patient Vitals for the past 12 hrs:   Temp Pulse Resp BP SpO2   02/24/17 1430 97.9 °F (36.6 °C) 96 18 119/78 96 %   02/24/17 1200 - (!) 101 - (!) 143/96 -      Intake & Output:     Intake/Output Summary (Last 24 hours) at 02/24/17 1946  Last data filed at 02/24/17 1828   Gross per 24 hour   Intake              240 ml   Output                0 ml   Net              240 ml      Laboratory Results:   No results found for this or any previous visit (from the past 12 hour(s)). Opportunity for questions and clarifications were given to the incoming nurse. Patient's bed is in low position, side rails x2, door open PRN, call bell within reach and patient not in distress.       Eugena Lennox, RN

## 2017-02-25 NOTE — PROGRESS NOTES
IMELDA contacted [address] [facility] who reported they are able to accept Pt back. IMELDA arranged BLS transport for 1:30PM today. IMELDA informed RN of this information and provided number to call report #[phone] room [phone].      Allen Almonte LCSW   Ext # [phone]

## 2017-03-07 VITALS
SYSTOLIC BLOOD PRESSURE: 138 MMHG | RESPIRATION RATE: 20 BRPM | HEART RATE: 96 BPM | DIASTOLIC BLOOD PRESSURE: 74 MMHG | OXYGEN SATURATION: 89 %

## 2017-03-08 ENCOUNTER — HOME CARE VISIT (OUTPATIENT)
Dept: HOSPICE | Facility: HOSPICE | Age: 82
End: 2017-03-08
Payer: MEDICARE

## 2023-08-08 NOTE — PROGRESS NOTES
----- Message from Isaias Murray MD sent at 8/8/2023 12:33 PM EDT -----  Sodium improving  Repeat level in 4 weeks Problem: Self Care Deficits Care Plan (Adult)  Goal: *Acute Goals and Plan of Care (Insert Text)  Occupational Therapy Goals  Initiated 2/21/2017  1. Patient will perform self-feeding in supported sitting with supervision/set-up within 7 day(s). 2. Patient will perform upper body dressing while sitting EOB with supervision/set-up within 7 day(s). 3. Patient will perform grooming while sitting EOB with contact guard assist within 7 day(s). 4. Patient will perform toilet transfers with minimal assistance at RW level within 7 day(s). OCCUPATIONAL THERAPY TREATMENT  Patient: Axel Ford (80 y.o. female)  Date: 2/23/2017  Diagnosis: fatigue  UTI (urinary tract infection) UTI (urinary tract infection)       Precautions: Contact  Chart, occupational therapy assessment, plan of care, and goals were reviewed. ASSESSMENT:  Patient participated in self-care today. She requires a lot of cues for initiation of task (responds well to counting to 3) and for encouragement. She will continue to benefit from skilled OT treatment to maximize independence and safety in ADL. Progression toward goals:  [ ]          Improving appropriately and progressing toward goals  [X]          Improving slowly and progressing toward goals  [ ]          Not making progress toward goals and plan of care will be adjusted       PLAN:  Patient continues to benefit from skilled intervention to address the above impairments. Continue treatment per established plan of care. Discharge Recommendations:  Skilled Nursing Facility  Further Equipment Recommendations for Discharge:  Defer to facility       SUBJECTIVE:   Patient stated I can't.       OBJECTIVE DATA SUMMARY:   Cognitive/Behavioral Status:  Neurologic State: Alert  Orientation Level: Oriented to person (oriented to year)              Functional Mobility and Transfers for ADLs:              Bed Mobility:     Supine to Sit: Moderate assistance; Additional time     Scooting: Minimum assistance              Transfers:  Sit to Stand: Minimum assistance                                      Toilet Transfer : Minimum assistance                                            Bathroom Mobility: Contact guard assistance                 Balance:  Sitting: Impaired  Sitting - Static: Fair (occasional)  Sitting - Dynamic: Fair (occasional)  Standing: Impaired  Standing - Static: Fair  Standing - Dynamic : Fair      ADL Intervention:  Feeding  Cutting Food: Total assistance (dependent)  Utensil Management: Minimum assistance  Food to Mouth: Minimum assistance  Drink to Mouth: Supervision/set-up  Cues: Verbal cues provided; Tactile cues provided;Visual cues provided              Toileting  Bladder Hygiene: Minimum assistance  Clothing Management: Minimum assistance  Cues: Verbal cues provided; Tactile cues provided  Adaptive Equipment: Walker              Pain:  Pain Scale 1: Adult Nonverbal Pain Scale  Pain Intensity 1: 0                 Activity Tolerance:    Fair to Poor  O2 >95% on room air throughout the session; Left her on room air; notified nursing.   After treatment:   [X]  Patient left in no apparent distress sitting up in chair  [ ]  Patient left in no apparent distress in bed  [X]  Call bell left within reach  [X]  Nursing notified  [ ]  Caregiver present  [ ]  Bed alarm activated      COMMUNICATION/COLLABORATION:   The patients plan of care was discussed with: Physical Therapist and Registered Nurse     Etha Gitelman, OTR/L  Time Calculation: 29 mins